# Patient Record
Sex: FEMALE | Race: WHITE | Employment: UNEMPLOYED | URBAN - METROPOLITAN AREA
[De-identification: names, ages, dates, MRNs, and addresses within clinical notes are randomized per-mention and may not be internally consistent; named-entity substitution may affect disease eponyms.]

---

## 2017-12-18 ENCOUNTER — HOSPITAL ENCOUNTER (OUTPATIENT)
Dept: LAB | Age: 27
Discharge: HOME | End: 2017-12-18

## 2018-07-03 ENCOUNTER — HOSPITAL ENCOUNTER (EMERGENCY)
Age: 28
Discharge: HOME | End: 2018-07-03
Attending: EMERGENCY MEDICINE
Payer: COMMERCIAL

## 2018-07-03 VITALS
TEMPERATURE: 97.8 F | HEART RATE: 65 BPM | HEIGHT: 69 IN | SYSTOLIC BLOOD PRESSURE: 121 MMHG | WEIGHT: 189.6 LBS | BODY MASS INDEX: 28.08 KG/M2 | RESPIRATION RATE: 16 BRPM | DIASTOLIC BLOOD PRESSURE: 71 MMHG | OXYGEN SATURATION: 100 %

## 2018-07-03 DIAGNOSIS — H66.90 ACUTE OTITIS MEDIA, UNSPECIFIED OTITIS MEDIA TYPE: ICD-10-CM

## 2018-07-03 DIAGNOSIS — H92.01 EAR PAIN, RIGHT: Primary | ICD-10-CM

## 2018-07-03 PROCEDURE — 99283 EMERGENCY DEPT VISIT LOW MDM: CPT

## 2018-07-03 PROCEDURE — 74011250637 HC RX REV CODE- 250/637: Performed by: EMERGENCY MEDICINE

## 2018-07-03 RX ORDER — AZITHROMYCIN 250 MG/1
250 TABLET, FILM COATED ORAL DAILY
Qty: 4 TAB | Refills: 0 | Status: SHIPPED | OUTPATIENT
Start: 2018-07-03 | End: 2018-07-07

## 2018-07-03 RX ORDER — AZITHROMYCIN 250 MG/1
500 TABLET, FILM COATED ORAL
Status: COMPLETED | OUTPATIENT
Start: 2018-07-03 | End: 2018-07-03

## 2018-07-03 RX ADMIN — AZITHROMYCIN 500 MG: 250 TABLET, FILM COATED ORAL at 02:39

## 2018-07-03 NOTE — DISCHARGE INSTRUCTIONS
Ear Infection (Otitis Media): Care Instructions  Your Care Instructions    An ear infection may start with a cold and affect the middle ear (otitis media). It can hurt a lot. Most ear infections clear up on their own in a couple of days. Most often you will not need antibiotics. This is because many ear infections are caused by a virus. Antibiotics don't work against a virus. Regular doses of pain medicines are the best way to reduce your fever and help you feel better. Follow-up care is a key part of your treatment and safety. Be sure to make and go to all appointments, and call your doctor if you are having problems. It's also a good idea to know your test results and keep a list of the medicines you take. How can you care for yourself at home? · Take pain medicines exactly as directed. ¨ If the doctor gave you a prescription medicine for pain, take it as prescribed. ¨ If you are not taking a prescription pain medicine, take an over-the-counter medicine, such as acetaminophen (Tylenol), ibuprofen (Advil, Motrin), or naproxen (Aleve). Read and follow all instructions on the label. ¨ Do not take two or more pain medicines at the same time unless the doctor told you to. Many pain medicines have acetaminophen, which is Tylenol. Too much acetaminophen (Tylenol) can be harmful. · Plan to take a full dose of pain reliever before bedtime. Getting enough sleep will help you get better. · Try a warm, moist washcloth on the ear. It may help relieve pain. · If your doctor prescribed antibiotics, take them as directed. Do not stop taking them just because you feel better. You need to take the full course of antibiotics. When should you call for help? Call your doctor now or seek immediate medical care if:  ? · You have new or increasing ear pain. ? · You have new or increasing pus or blood draining from your ear. ? · You have a fever with a stiff neck or a severe headache. ? Watch closely for changes in your health, and be sure to contact your doctor if:  ? · You have new or worse symptoms. ? · You are not getting better after taking an antibiotic for 2 days. Where can you learn more? Go to http://kwasi-binta.info/. Enter B263 in the search box to learn more about \"Ear Infection (Otitis Media): Care Instructions. \"  Current as of: May 12, 2017  Content Version: 11.4  © 1158-0839 [employer]. Care instructions adapted under license by [employer] (which disclaims liability or warranty for this information). If you have questions about a medical condition or this instruction, always ask your healthcare professional. [place] [address] any warranty or liability for your use of this information. Earache: Care Instructions  Your Care Instructions    Even though infection is a common cause of ear pain, not all ear pain means an infection. If you have ear pain and don't have an infection, it could be because of a jaw problem, such as temporomandibular joint (TMJ) pain. Or it could be because of a neck problem. When ear discomfort or pain is mild or comes and goes without other symptoms, home treatment may be all you need. Follow-up care is a key part of your treatment and safety. Be sure to make and go to all appointments, and call your doctor if you are having problems. It's also a good idea to know your test results and keep a list of the medicines you take. How can you care for yourself at home? · Apply heat on the ear to ease pain. To apply heat, put a warm water bottle, a heating pad set on low, or a warm cloth on your ear. Do not go to sleep with a heating pad on your skin. · Take an over-the-counter pain medicine, such as acetaminophen (Tylenol), ibuprofen (Advil, Motrin), or naproxen (Aleve). Be safe with medicines. Read and follow all instructions on the label.   · Do not take two or more pain medicines at the same time unless the doctor told you to. Many pain medicines have acetaminophen, which is Tylenol. Too much acetaminophen (Tylenol) can be harmful. · Never insert anything, such as a cotton swab or a marianela pin, into the ear. When should you call for help? Call your doctor now or seek immediate medical care if:  ? · You have new or worse symptoms of infection, such as:  ¨ Increased pain, swelling, warmth, or redness. ¨ Red streaks leading from the area. ¨ Pus draining from the area. ¨ A fever. ? Watch closely for changes in your health, and be sure to contact your doctor if:  ? · You have new or worse discharge coming from the ear. ? · You do not get better as expected. Where can you learn more? Go to http://kwasi-binta.info/. Enter U852 in the search box to learn more about \"Earache: Care Instructions. \"  Current as of: May 12, 2017  Content Version: 11.4  © 3369-5175 [employer]. Care instructions adapted under license by [employer] (which disclaims liability or warranty for this information). If you have questions about a medical condition or this instruction, always ask your healthcare professional. [address] any warranty or liability for your use of this information.
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
## 2018-07-03 NOTE — LETTER
[address] 
\[facility]\"" EMERGENCY DEPT 
[address] [id number] [phone] Work/School Note Date: 7/3/2018 To Whom It May concern: 
 
Austyn Sanders was seen and treated today in the emergency room by the following provider(s): 
Attending Provider: Osiris Yoder MD. Austyn Sanders may return to work on 7/5/18.  
 
Sincerely, 
 
 
 
 
Osiris Yoder MD

## 2018-07-07 ENCOUNTER — HOSPITAL ENCOUNTER (EMERGENCY)
Age: 28
Discharge: HOME | End: 2018-07-07
Attending: EMERGENCY MEDICINE
Payer: COMMERCIAL

## 2018-07-07 VITALS
HEIGHT: 69 IN | DIASTOLIC BLOOD PRESSURE: 76 MMHG | BODY MASS INDEX: 28.47 KG/M2 | OXYGEN SATURATION: 100 % | HEART RATE: 78 BPM | RESPIRATION RATE: 16 BRPM | SYSTOLIC BLOOD PRESSURE: 123 MMHG | TEMPERATURE: 97.9 F | WEIGHT: 192.24 LBS

## 2018-07-07 DIAGNOSIS — H93.8X1 MASS OF EAR CANAL, RIGHT: Primary | ICD-10-CM

## 2018-07-07 PROCEDURE — 74011250636 HC RX REV CODE- 250/636: Performed by: PHYSICIAN ASSISTANT

## 2018-07-07 PROCEDURE — 99283 EMERGENCY DEPT VISIT LOW MDM: CPT

## 2018-07-07 PROCEDURE — 96372 THER/PROPH/DIAG INJ SC/IM: CPT

## 2018-07-07 PROCEDURE — 74011250637 HC RX REV CODE- 250/637: Performed by: PHYSICIAN ASSISTANT

## 2018-07-07 PROCEDURE — 74011000250 HC RX REV CODE- 250: Performed by: PHYSICIAN ASSISTANT

## 2018-07-07 RX ORDER — ONDANSETRON 4 MG/1
4 TABLET, ORALLY DISINTEGRATING ORAL
Status: COMPLETED | OUTPATIENT
Start: 2018-07-07 | End: 2018-07-07

## 2018-07-07 RX ORDER — LAMOTRIGINE 100 MG/1
100 TABLET ORAL DAILY
COMMUNITY
End: 2019-01-23

## 2018-07-07 RX ORDER — IBUPROFEN 800 MG/1
800 TABLET ORAL
Qty: 20 TAB | Refills: 0 | Status: SHIPPED | OUTPATIENT
Start: 2018-07-07 | End: 2018-07-14

## 2018-07-07 RX ORDER — OFLOXACIN 3 MG/ML
5 SOLUTION AURICULAR (OTIC) 2 TIMES DAILY
COMMUNITY
End: 2018-10-19

## 2018-07-07 RX ORDER — OXYCODONE AND ACETAMINOPHEN 5; 325 MG/1; MG/1
1 TABLET ORAL
Qty: 12 TAB | Refills: 0 | Status: SHIPPED | OUTPATIENT
Start: 2018-07-07 | End: 2018-10-19

## 2018-07-07 RX ORDER — OXYCODONE AND ACETAMINOPHEN 5; 325 MG/1; MG/1
2 TABLET ORAL
Status: COMPLETED | OUTPATIENT
Start: 2018-07-07 | End: 2018-07-07

## 2018-07-07 RX ADMIN — OXYCODONE HYDROCHLORIDE AND ACETAMINOPHEN 2 TABLET: 5; 325 TABLET ORAL at 18:21

## 2018-07-07 RX ADMIN — LIDOCAINE HYDROCHLORIDE 1 G: 10 INJECTION, SOLUTION EPIDURAL; INFILTRATION; INTRACAUDAL; PERINEURAL at 18:22

## 2018-07-07 RX ADMIN — ONDANSETRON 4 MG: 4 TABLET, ORALLY DISINTEGRATING ORAL at 18:27

## 2018-07-07 NOTE — DISCHARGE INSTRUCTIONS
[facility] SYSTEMS Departments     For adult and child immunizations, family planning, TB screening, STD testing and women's health services. [facility]: [place] [phone]      [facility] 25   [address]   [address]   170 [facility] [place] [address] [facility] [address] [phone]      [address]          [place] [place] [address]     For primary care services, woman and child wellness, and some clinics providing specialty care. [facility] -- 1011 [facility]. 2525 [facility] [phone]   411 [facility] 200 [facility] 3614 [facility] [phone]   339 [facility] [address]  [phone] [address]  [employer] [address] [place] [address] 5850  Community  [phone]   [address] [place] [address] [address] [phone]   [facility] 81 [facility] [phone]   [facility] 1051 [facility] [phone]   [facility]: [facility] [address], [address] [place] [address], [address] [phone]     [address] [phone]   [facility] [address]  Community  [phone]   [employer]  [address] ([email or web address].it/about/mission. asp) [phone]         Sexual Health/Woman Wellness Clinics    For STD/HIV testing and treatment, pregnancy testing and services, men's health, birth control services, LGBT services, and hepatitis/HPV vaccine services. [employer] for [employer] 201 [address] [facility] [address] [facility] [address] [facility] 1579 600 [facility] [phone]   [facility] [address], 5th floor [phone]   Pregnancy [address] Women [address]  401 [address] [facility] [phone]         Specialty Service [address]   [phone]   [place] [employer]   [phone]   Women, Infant and Children's Services: [address] [phone]       600 [facility]   [phone]   [address]   [facility]     [phone]   [employer] [facility]   [address] [facility] [address] [phone]     Local Primary Care Physicians  [facility] [phone]  MD Jorge Powell MD Balinda Ovens, MD [facility] Doctors [phone]  Isaac Lou, [facility]  Hi Logan, MD Awais Weeks MD [address] [place] [address] [phone]  MD Radha Garrido MD [address] [facility] [phone]  MD Olya Schaefer MD Valorie Constable, MD Shawn Marie MD   [facility] [phone]  [facility], MD Connie Geiger, NP [address] [phone]  Carola Grego, MD Raj Hunt, MD Franki Bing, MD Lanney Gift, MD Letha Short, MD Michaela Saint, MD Rafael Feng MD   33 57 [facility]  Latanya Harry MD [address] [facility] [phone]  MD Lashaun Pedroza, LETITIA Juarez, MD Rolm Bonine, MD Brunilda Bloom, MD Maudie Robinsons, MD Nash Koyanagi, MD   5078 [facility] [phone]  Chino Guerra, MD Krystal Encinas, FNP  Tonio Murillo, NP  MD Lea Albarado MD Graceann General, MD Latoya Brizuela MD [facility] [phone]  MD Joseph Carlson MD Pennie Sorenson, MD Mickle Comes, MD Irving Craven, MD   [address] [phone]  Oumar MD Tye Gustafson MD [place] [phone]  MD Lupe Turcios MD Zippor[address] Lambert [place], [address] [facility] [phone]  Cameron Huerta, MD Baldemar Gonzalez, MD Awais Bhakta, MD Brooke Waller, MD Wayne Enriquez, MD Chiquita Neal, LETITIA Upton MD [address]  66   [phone]  MD Katerina Clarke, MD Daja Victoria MD   2102 [facility] [phone]  MD Joaquim Meredith, ANGELAP  Kacey Escalera, GAIL Escalera, FNP Arlan Haddock, PA-C Peri Cushing, MD Corbin Mantis, LETITIA Morel, DO Miscellaneous:  Shaggy Guevara [phone]

## 2018-07-07 NOTE — ED NOTES
Patient discharged by LUIS Chow. Patient provided with discharge instructions Rx and instructions on follow up care. Patient out of ED ambulatory accompanied by family.

## 2018-07-07 NOTE — ED PROVIDER NOTES
EMERGENCY DEPARTMENT HISTORY AND PHYSICAL EXAM 
 
 
Date: 7/7/2018 Patient Name: Chad Zhu History of Presenting Illness Chief Complaint Patient presents with  Ear Pain  
  right ear pain seen here monday thought she had bug in ear; given antibiotic; went to [facility] seen told maybe a cyst in right ear. pain is worse History Provided By: Patient HPI: Chad Zhu, 28 y.o. female presents ambulatory to the ED with cc of 5 days of 8 out of 10 constant, aching right ear pain that does not improve with oral or topical antibiotics. This is her third visit for this and was told by [facility] ED on Thursday that she has a cyst in her ear and would need to be managed by ENT. She has an ENT appointment on [id number] but feels she cannot wait that long due to the pain. She admits to a history of cystic lesions on her skin and indeed has in her medical records history of a left ear cyst that required surgical remediation. She specifically denies any fevers, chills, nausea, vomiting, chest pain, shortness of breath, headache, rash, diarrhea, sweating or weight loss. Chief Complaint: right ear pain Duration: 5 Days Timing:  Constant Location: right ear Quality: Aching Severity: 8 out of 10 Modifying Factors: no improvement with recent antibiotics Associated Symptoms: denies any other associated signs or symptoms There are no other complaints, changes, or physical findings at this time. PCP: None Current Outpatient Prescriptions Medication Sig Dispense Refill  lamoTRIgine (LAMICTAL) 100 mg tablet Take 100 mg by mouth daily.  ofloxacin (FLOXIN) 0.3 % otic solution Administer 5 Drops in right ear two (2) times a day.  ibuprofen (MOTRIN) 800 mg tablet Take 1 Tab by mouth every eight (8) hours as needed for Pain for up to 7 days. 20 Tab 0  
 oxyCODONE-acetaminophen (PERCOCET) 5-325 mg per tablet Take 1 Tab by mouth every four (4) hours as needed for Pain. Max Daily Amount: 6 Tabs. 12 Tab 0  propranolol (INDERAL) 10 mg tablet TAKE 1 TABLET BY MOUTH 30-60 MIN PRIOR TO PRESENTATION, MAY TAKE TWICE DAILY IF NEEDED 30 Tab 5  ARIPIPRAZOLE (ABILIFY PO) Take 1 Tab by mouth daily. Past History Past Medical History: 
Past Medical History:  
Diagnosis Date  Abuse  Anxiety  Depression  ETOH abuse   
 sober since 7/2008  HPV in female  Insomnia Past Surgical History: 
Past Surgical History:  
Procedure Laterality Date  HX CYST INCISION AND DRAINAGE  1/2014 Boil/cyst on right buttock  HX OTHER SURGICAL  2015  
 cyst removed by left ear Family History: 
Family History Problem Relation Age of Onset  Cancer Maternal Grandfather   
  lung  Cancer Paternal Grandmother   
  lung  No Known Problems Mother  No Known Problems Father Social History: 
Social History Substance Use Topics  Smoking status: Current Some Day Smoker Packs/day: 1.00 Types: Cigarettes  Smokeless tobacco: Never Used  Alcohol use No  
 
 
Allergies: Allergies Allergen Reactions  Amoxicillin Rash  Bactrim [Sulfamethoprim Ds] Rash Review of Systems Review of Systems Constitutional: Negative for fatigue and fever. HENT: Positive for ear pain. Negative for sore throat. Eyes: Negative for pain, redness and visual disturbance. Respiratory: Negative for cough and shortness of breath. Cardiovascular: Negative for chest pain and palpitations. Gastrointestinal: Negative for abdominal pain, nausea and vomiting. Genitourinary: Negative for dysuria, frequency and urgency. Musculoskeletal: Negative for back pain, gait problem, neck pain and neck stiffness. Skin: Negative for rash and wound. Neurological: Negative for dizziness, weakness, light-headedness, numbness and headaches. Physical Exam  
Physical Exam  
Constitutional: She is oriented to person, place, and time.  She appears well-developed and well-nourished. Non-toxic appearance. No distress. HENT:  
Head: Normocephalic and atraumatic. Right Ear: External ear normal.  
Left Ear: External ear normal.  
Nose: Nose normal.  
Mouth/Throat: Uvula is midline. No trismus in the jaw. RIGHT EAR: 
No tragal tenderness No mastoid tenderness Ear canal is without cerumen A visible ridge is evident within the canal, perhaps a cyst, that partially obstructs. Visualized portion of TM is without significant redness. There is no drainage. Eyes: Conjunctivae and EOM are normal. Pupils are equal, round, and reactive to light. No scleral icterus. Neck: Normal range of motion and full passive range of motion without pain. Cardiovascular: Normal rate and regular rhythm. Pulmonary/Chest: Effort normal. No accessory muscle usage. No tachypnea. No respiratory distress. She has no decreased breath sounds. She has no wheezes. Abdominal: Soft. There is no tenderness. Musculoskeletal: Normal range of motion. Neurological: She is alert and oriented to person, place, and time. She is not disoriented. No cranial nerve deficit. GCS eye subscore is 4. GCS verbal subscore is 5. GCS motor subscore is 6. Skin: Skin is intact. No rash noted. Psychiatric: She has a normal mood and affect. Her speech is normal.  
Nursing note and vitals reviewed. Diagnostic Study Results Labs - No results found for this or any previous visit (from the past 12 hour(s)). Radiologic Studies - No orders to display CT Results  (Last 48 hours) None CXR Results  (Last 48 hours) None Medical Decision Making I am the first provider for this patient. I reviewed the vital signs, available nursing notes, past medical history, past surgical history, family history and social history. Vital Signs-Reviewed the patient's vital signs.  
Patient Vitals for the past 12 hrs: 
 Temp Pulse Resp BP SpO2  
07/07/18 1702 97.9 °F (36.6 °C) 78 16 123/76 100 % Records Reviewed: Nursing Notes and Old Medical Records Provider Notes (Medical Decision Making): DDx: ear canal cyst; AOM; otitis externa; foreign body; TM perforation ED Course:  
Initial assessment performed. The patients presenting problems have been discussed, and they are in agreement with the care plan formulated and outlined with them. I have encouraged them to ask questions as they arise throughout their visit. Disposition: 
Discharge PLAN: 
1. Current Discharge Medication List  
  
START taking these medications Details  
ibuprofen (MOTRIN) 800 mg tablet Take 1 Tab by mouth every eight (8) hours as needed for Pain for up to 7 days. Qty: 20 Tab, Refills: 0  
  
oxyCODONE-acetaminophen (PERCOCET) 5-325 mg per tablet Take 1 Tab by mouth every four (4) hours as needed for Pain. Max Daily Amount: 6 Tabs. Qty: 12 Tab, Refills: 0 Associated Diagnoses: Mass of ear canal, right 2. Follow-up Information Follow up With Details Comments Contact Info Natty Reynoso MD Schedule an appointment as soon as possible for a visit ENT: call to schedule follow up [address] [facility] 
[phone] [facility] Schedule an appointment as soon as possible for a visit ENT: or call to schedule follow up [address] [facility] [address] [facility] 
[phone] Return to ED if worse Diagnosis Clinical Impression: 1. Mass of ear canal, right

## 2018-07-07 NOTE — LETTER
[address] 
[facility] EMERGENCY DEPT 
[address] [place] [address] [address] [id number] [phone] Work/School Note Date: 7/7/2018 To Whom It May concern: 
 
Portia Santillan was seen and treated today in the emergency room by the following provider(s): 
Attending Provider: Rolf Orozco. Keyur Campo MD 
Physician Assistant: LUIS Guerrero. Portia Santillan may return to work on [id number]. Sincerely, LUIS Guerrero

## 2018-07-07 NOTE — ED TRIAGE NOTES
Assumed care of this patient. She is alert and oriented x4. Patient ambulatory to ED with c/o right ear pain. Patient seen in this ED 3 Jul for ear pain and was told that she had abrasion to the ear drum. Patient continued to have pain and was seen by [facility] and was told that she may potentially have a cyst and was referred to ENT. Patient states that she has appt on 23 July, but is unable to wait because of the pain.

## 2018-09-22 ENCOUNTER — HOSPITAL ENCOUNTER (EMERGENCY)
Age: 28
Discharge: HOME | End: 2018-09-22
Attending: EMERGENCY MEDICINE
Payer: SELF-PAY

## 2018-09-22 VITALS
TEMPERATURE: 98.6 F | RESPIRATION RATE: 14 BRPM | SYSTOLIC BLOOD PRESSURE: 136 MMHG | BODY MASS INDEX: 27.76 KG/M2 | OXYGEN SATURATION: 100 % | HEIGHT: 69 IN | WEIGHT: 187.39 LBS | DIASTOLIC BLOOD PRESSURE: 89 MMHG | HEART RATE: 104 BPM

## 2018-09-22 DIAGNOSIS — L02.91 ABSCESS: Primary | ICD-10-CM

## 2018-09-22 PROCEDURE — 75810000289 HC I&D ABSCESS SIMP/COMP/MULT

## 2018-09-22 PROCEDURE — 74011250637 HC RX REV CODE- 250/637: Performed by: PHYSICIAN ASSISTANT

## 2018-09-22 PROCEDURE — 99283 EMERGENCY DEPT VISIT LOW MDM: CPT

## 2018-09-22 RX ORDER — DOXYCYCLINE HYCLATE 100 MG
100 TABLET ORAL 2 TIMES DAILY
Qty: 14 TAB | Refills: 0 | Status: SHIPPED | OUTPATIENT
Start: 2018-09-22 | End: 2018-09-29

## 2018-09-22 RX ORDER — LIDOCAINE HYDROCHLORIDE AND EPINEPHRINE 20; 10 MG/ML; UG/ML
5 INJECTION, SOLUTION INFILTRATION; PERINEURAL ONCE
Status: DISCONTINUED | OUTPATIENT
Start: 2018-09-22 | End: 2018-09-22 | Stop reason: HOSPADM

## 2018-09-22 RX ORDER — HYDROCODONE BITARTRATE AND ACETAMINOPHEN 5; 325 MG/1; MG/1
1 TABLET ORAL
Qty: 10 TAB | Refills: 0 | Status: SHIPPED | OUTPATIENT
Start: 2018-09-22 | End: 2018-10-19

## 2018-09-22 RX ORDER — OXYCODONE AND ACETAMINOPHEN 5; 325 MG/1; MG/1
1 TABLET ORAL
Status: COMPLETED | OUTPATIENT
Start: 2018-09-22 | End: 2018-09-22

## 2018-09-22 RX ADMIN — OXYCODONE HYDROCHLORIDE AND ACETAMINOPHEN 1 TABLET: 5; 325 TABLET ORAL at 19:06

## 2018-09-22 NOTE — DISCHARGE INSTRUCTIONS
Thank you!     Thank you for allowing us to provide you with excellent care today. We hope we addressed all of your concerns and needs. We strive to provide excellent quality care in the Emergency Department. You will receive a survey after your visit to evaluate the care you were provided.      Please rate us a level 5 (excellent), as anything less than excellent does not meet our goals.      If you feel that you have not received excellent quality care or timely care, please ask to speak to the nurse manager. Please choose us in the future for your continued health care needs. ______________________________________________________________________    The exam and treatment you received in the Emergency Department were for an urgent problem and are not intended as complete care. It is important that you follow-up with a doctor, nurse practitioner, or physician assistant to:  (1) confirm your diagnosis,  (2) re-evaluation of changes in your illness and treatment, and  (3) for ongoing care. If your symptoms become worse or you do not improve as expected and you are unable to reach your usual health care provider, you should return to the Emergency Department. We are available 24 hours a day. Take this sheet with you when you go to your follow-up visit. If you have any problem arranging the follow-up visit, contact [address] [place] [address] 21 [phone])    Make an appointment with your Primary Care doctor for follow up of this visit. Return to the ER if you are unable to be seen in the time recommended on your discharge instructions. Skin Abscess: Care Instructions  Your Care Instructions    A skin abscess is a bacterial infection that forms a pocket of pus. A boil is a kind of skin abscess. The doctor may have cut an opening in the abscess so that the pus can drain out. You may have gauze in the cut so that the abscess will stay open and keep draining. You may need antibiotics.  You will need to follow up with your doctor to make sure the infection has gone away. The doctor has checked you carefully, but problems can develop later. If you notice any problems or new symptoms, get medical treatment right away. Follow-up care is a key part of your treatment and safety. Be sure to make and go to all appointments, and call your doctor if you are having problems. It's also a good idea to know your test results and keep a list of the medicines you take. How can you care for yourself at home? · Apply warm and dry compresses, a heating pad set on low, or a hot water bottle 3 or 4 times a day for pain. Keep a cloth between the heat source and your skin. · If your doctor prescribed antibiotics, take them as directed. Do not stop taking them just because you feel better. You need to take the full course of antibiotics. · Take pain medicines exactly as directed. ¨ If the doctor gave you a prescription medicine for pain, take it as prescribed. ¨ If you are not taking a prescription pain medicine, ask your doctor if you can take an over-the-counter medicine. · Keep your bandage clean and dry. Change the bandage whenever it gets wet or dirty, or at least one time a day. · If the abscess was packed with gauze:  ¨ Keep follow-up appointments to have the gauze changed or removed. If the doctor instructed you to remove the gauze, gently pull out all of the gauze when your doctor tells you to. ¨ After the gauze is removed, soak the area in warm water for 15 to 20 minutes 2 times a day, until the wound closes. When should you call for help? Call your doctor now or seek immediate medical care if:    · You have signs of worsening infection, such as:  ¨ Increased pain, swelling, warmth, or redness. ¨ Red streaks leading from the infected skin. ¨ Pus draining from the wound. ¨ A fever.    Watch closely for changes in your health, and be sure to contact your doctor if:    · You do not get better as expected. Where can you learn more?   Go to http://kwasi-binta.info/. Enter C824 in the search box to learn more about \"Skin Abscess: Care Instructions. \"  Current as of: May 10, 2017  Content Version: 11.7  © 2575-1674 [employer]. Care instructions adapted under license by [employer] (which disclaims liability or warranty for this information). If you have questions about a medical condition or this instruction, always ask your healthcare professional. [place] [address] any warranty or liability for your use of this information.

## 2018-09-22 NOTE — ED PROVIDER NOTES
EMERGENCY DEPARTMENT HISTORY AND PHYSICAL EXAM 
 
 
Date: 9/22/2018 Patient Name: Sudha Amaya History of Presenting Illness Chief Complaint Patient presents with  Abscess Ambulatory into the ED with c/o abscess to Lt groin x 2 days; history of the same. History Provided By: Patient HPI: Sudha Amaya, 28 y.o. female with PMHx significant for anxiety, EtOH abuse, and depression, presents ambulatory to the ED with cc of abscess. Patient states that for the past 2 days she had had an abscess in her left groin. She has tried warm compresses with no relief of symptoms. She reports a history of frequent abscesses and diagnosis of hidradenitis. She denies any drainage from the site, but notes that it is very painful. She denies fevers, chills, NVD, abdominal pain or recent antibiotic use. Denies any other complaints at this time. Chief Complaint: abscess Duration: 2 Days Timing:  Acute Location: left groin Quality: Aching Severity: 8 out of 10 Modifying Factors: none Associated Symptoms: denies any other associated signs or symptoms There are no other complaints, changes, or physical findings at this time. PCP: None Current Facility-Administered Medications Medication Dose Route Frequency Provider Last Rate Last Dose  lidocaine-EPINEPHrine (XYLOCAINE) 2 %-1:100,000 injection 100 mg  5 mL SubCUTAneous ONCE Alessia Nolasco PA-C Current Outpatient Prescriptions Medication Sig Dispense Refill  doxycycline (VIBRA-TABS) 100 mg tablet Take 1 Tab by mouth two (2) times a day for 7 days. 14 Tab 0  
 HYDROcodone-acetaminophen (NORCO) 5-325 mg per tablet Take 1 Tab by mouth every six (6) hours as needed for Pain. Max Daily Amount: 4 Tabs. 10 Tab 0  
 lamoTRIgine (LAMICTAL) 100 mg tablet Take 100 mg by mouth daily.  ofloxacin (FLOXIN) 0.3 % otic solution Administer 5 Drops in right ear two (2) times a day.  oxyCODONE-acetaminophen (PERCOCET) 5-325 mg per tablet Take 1 Tab by mouth every four (4) hours as needed for Pain. Max Daily Amount: 6 Tabs. 12 Tab 0  propranolol (INDERAL) 10 mg tablet TAKE 1 TABLET BY MOUTH 30-60 MIN PRIOR TO PRESENTATION, MAY TAKE TWICE DAILY IF NEEDED 30 Tab 5  ARIPIPRAZOLE (ABILIFY PO) Take 1 Tab by mouth daily. Past History Past Medical History: 
Past Medical History:  
Diagnosis Date  Abuse  Anxiety  Depression  ETOH abuse   
 sober since 7/2008  HPV in female  Insomnia Past Surgical History: 
Past Surgical History:  
Procedure Laterality Date  HX CYST INCISION AND DRAINAGE  1/2014 Boil/cyst on right buttock  HX OTHER SURGICAL  2015  
 cyst removed by left ear Family History: 
Family History Problem Relation Age of Onset  Cancer Maternal Grandfather   
  lung  Cancer Paternal Grandmother   
  lung  No Known Problems Mother  No Known Problems Father Social History: 
Social History Substance Use Topics  Smoking status: Current Some Day Smoker Packs/day: 1.00 Types: Cigarettes  Smokeless tobacco: Never Used  Alcohol use No  
 
 
Allergies: Allergies Allergen Reactions  Amoxicillin Rash  Bactrim [Sulfamethoprim Ds] Rash Review of Systems Review of Systems Constitutional: Negative for chills and fever. HENT: Negative for sore throat. Eyes: Negative for pain. Respiratory: Negative for cough and shortness of breath. Cardiovascular: Negative for chest pain. Gastrointestinal: Negative for abdominal pain, diarrhea, nausea and vomiting. Genitourinary: Negative for dysuria and hematuria. Musculoskeletal: Negative for arthralgias and myalgias. Skin: Abscess to left groin Neurological: Negative for dizziness, light-headedness, numbness and headaches. Psychiatric/Behavioral: Negative for behavioral problems and confusion.   
 
 
Physical Exam  
 Physical Exam  
Constitutional: She is oriented to person, place, and time. She appears well-developed and well-nourished. No distress. HENT:  
Head: Normocephalic and atraumatic. Right Ear: External ear normal.  
Left Ear: External ear normal.  
Nose: Nose normal.  
Eyes: Conjunctivae and EOM are normal.  
Neck: Normal range of motion. Neck supple. Cardiovascular: Normal rate, regular rhythm and normal heart sounds. Pulmonary/Chest: Effort normal and breath sounds normal. She has no decreased breath sounds. She has no wheezes. Abdominal: Soft. Bowel sounds are normal. She exhibits no distension. There is no tenderness. There is no guarding. Musculoskeletal: Normal range of motion. She exhibits no edema or tenderness. Neurological: She is alert and oriented to person, place, and time. Skin: Skin is warm and dry. She is not diaphoretic. Psychiatric: She has a normal mood and affect. Her behavior is normal. Judgment normal.  
Nursing note and vitals reviewed. Diagnostic Study Results Labs - No results found for this or any previous visit (from the past 12 hour(s)). Radiologic Studies - Medical Decision Making I am the first provider for this patient. I reviewed the vital signs, available nursing notes, past medical history, past surgical history, family history and social history. Vital Signs-Reviewed the patient's vital signs. Patient Vitals for the past 12 hrs: 
 Temp Pulse Resp BP SpO2  
09/22/18 1845 98.6 °F (37 °C) (!) 104 14 136/89 100 % Records Reviewed: Nursing Notes and Old Medical Records Provider Notes (Medical Decision Making): DDx: abscess, folliculitis, cellulitis, poor hygiene. Patient presents with abscess formation to left groin. Will perform I&D and d/c with antibiotics. Recommend f/u with PCP in 2-3 days for wound recheck. ED Course:  
Initial assessment performed.  The patients presenting problems have been discussed, and they are in agreement with the care plan formulated and outlined with them. I have encouraged them to ask questions as they arise throughout their visit. Procedure Note - Incision and Drainage:  
7:40 PM 
Performed by: Sedrick Pradhan PA-C Complexity: Simple Skin prepped with Hibiclens. Sterile field established. Anesthesia achieved with 1.5 mLs of Lidocaine 2% with epinephrine using a local infiltration. Abscess to inguinal region: left was incised with # 11 blade, and 3 mLs of purulent drainage was expressed. Wound probed and irrigated. Sterile dressing applied. Estimated blood loss: < 5 mL The procedure took 1-15 minutes, and pt tolerated well. Disposition: 
DISCHARGE NOTE: 
7:51 PM 
The care plan has been outline with the patient and/or family, who verbally conveyed understanding and agreement. Available results have been reviewed. Patient and/or family understand the follow up plan as outlined and discharge instructions. Should their condition deterioration at any time after discharge the patient agrees to return, follow up sooner than outlined or seek medical assistance at the closest Emergency Room as soon as possible. Questions have been answered. Discharge instructions and educational information regarding the patient's diagnosis as well a list of reasons why the patient would want to seek immediate medical attention, should their condition change, were reviewed directly with the patient/family PLAN: 
1. Discharge home 2. Medications as directed 3. Schedule f/u with PCP 4. Return precautions reviewed Discharge Medication List as of 9/22/2018  7:49 PM  
  
START taking these medications Details  
doxycycline (VIBRA-TABS) 100 mg tablet Take 1 Tab by mouth two (2) times a day for 7 days. , Normal, Disp-14 Tab, R-0  
  
HYDROcodone-acetaminophen (NORCO) 5-325 mg per tablet Take 1 Tab by mouth every six (6) hours as needed for Pain. Max Daily Amount: 4 Tabs., Print, Disp-10 Tab, R-0  
  
  
CONTINUE these medications which have NOT CHANGED Details  
lamoTRIgine (LAMICTAL) 100 mg tablet Take 100 mg by mouth daily. , Historical Med  
  
ofloxacin (FLOXIN) 0.3 % otic solution Administer 5 Drops in right ear two (2) times a day., Historical Med  
  
oxyCODONE-acetaminophen (PERCOCET) 5-325 mg per tablet Take 1 Tab by mouth every four (4) hours as needed for Pain. Max Daily Amount: 6 Tabs., Print, Disp-12 Tab, R-0  
  
propranolol (INDERAL) 10 mg tablet TAKE 1 TABLET BY MOUTH 30-60 MIN PRIOR TO PRESENTATION, MAY TAKE TWICE DAILY IF NEEDED, Normal, Disp-30 Tab, R-5  
  
ARIPIPRAZOLE (ABILIFY PO) Take 1 Tab by mouth daily. , Historical Med  
  
  
 
 
5.  
Follow-up Information Follow up With Details Comments Contact Info Establish PCP     
 [facility] EMERGENCY DEPT  As needed, If symptoms worsen, For wound re-check [address] [place] [address] [address] [facility] 
[phone] Return to ED if worse Diagnosis Clinical Impression: 1. Abscess This note will not be viewable in [address]

## 2018-09-22 NOTE — LETTER
[address] 
[facility] EMERGENCY DEPT 
[address] [facility] [address] [id number] [phone] Work/School Note Date: 9/22/2018 To Whom It May concern: 
 
Mickie Simeon was seen and treated today in the emergency room by the following provider(s): 
Attending Provider: Juan Carlos Ford DO Physician Assistant: Ellen Johnson PA-C. Mickie Simeon may return to work on 25 September 2018. Sincerely, Ellen Johnson PA-C

## 2018-09-23 NOTE — ED NOTES
Pt received discharge instructions from PA and verbalized understanding. Pt ambulatory to exit with a steady gait.

## 2018-10-19 ENCOUNTER — HOSPITAL ENCOUNTER (EMERGENCY)
Age: 28
Discharge: HOME | End: 2018-10-19
Attending: EMERGENCY MEDICINE
Payer: SELF-PAY

## 2018-10-19 VITALS
BODY MASS INDEX: 27.59 KG/M2 | DIASTOLIC BLOOD PRESSURE: 78 MMHG | OXYGEN SATURATION: 100 % | HEIGHT: 69 IN | HEART RATE: 75 BPM | WEIGHT: 186.29 LBS | RESPIRATION RATE: 16 BRPM | TEMPERATURE: 97.5 F | SYSTOLIC BLOOD PRESSURE: 123 MMHG

## 2018-10-19 DIAGNOSIS — L02.224 BOIL, GROIN: ICD-10-CM

## 2018-10-19 DIAGNOSIS — L73.2 SUPPURATIVE HIDRADENITIS: Primary | ICD-10-CM

## 2018-10-19 PROCEDURE — 99282 EMERGENCY DEPT VISIT SF MDM: CPT

## 2018-10-19 RX ORDER — DOXYCYCLINE 150 MG/1
150 TABLET ORAL 2 TIMES DAILY
COMMUNITY
Start: 2018-10-18 | End: 2018-10-24

## 2018-10-19 NOTE — ED PROVIDER NOTES
EMERGENCY DEPARTMENT HISTORY AND PHYSICAL EXAM 
 
 
Date: 10/19/2018 Patient Name: Calista Camacho History of Presenting Illness Chief Complaint Patient presents with  Abscess Patient complain of an \"open wound\" to left groin History Provided By: Patient HPI: Calista Camacho, 28 y.o. female with PMHx significant for hidradenitis, presents to the ED with cc of a boil to the right groin x several days that is itchy and bleeding. Pt notes numerous boils around her \"underwear line\" in the past that have needed to get drained. Pt notes this one continues to bleed. There has been no purulent drainage. There are no other complaints, changes, or physical findings at this time. Social Hx: Tobacco (uses a vape pen with nicotine), EtOH (social), Illicit drug use (denies) PCP: None Current Outpatient Medications Medication Sig Dispense Refill  doxycycline monohydrate (VIBRAMYCIN) 150 mg tab tablet Take 150 mg by mouth two (2) times a day.  propranolol (INDERAL) 10 mg tablet TAKE 1 TABLET BY MOUTH 30-60 MIN PRIOR TO PRESENTATION, MAY TAKE TWICE DAILY IF NEEDED 30 Tab 5  lamoTRIgine (LAMICTAL) 100 mg tablet Take 100 mg by mouth daily.  ARIPIPRAZOLE (ABILIFY PO) Take 1 Tab by mouth daily. Past History Past Medical History: 
Past Medical History:  
Diagnosis Date  Abuse  Anxiety  Depression  ETOH abuse   
 sober since 7/2008  HPV in female  Insomnia Past Surgical History: 
Past Surgical History:  
Procedure Laterality Date  HX CYST INCISION AND DRAINAGE  1/2014 Boil/cyst on right buttock  HX OTHER SURGICAL  2015  
 cyst removed by left ear Family History: 
Family History Problem Relation Age of Onset  Cancer Maternal Grandfather   
     lung  Cancer Paternal Grandmother   
     lung  No Known Problems Mother  No Known Problems Father Social History: 
Social History Tobacco Use  
  Smoking status: Current Some Day Smoker Packs/day: 1.00 Types: Cigarettes  Smokeless tobacco: Never Used Substance Use Topics  Alcohol use: No  
 Drug use: No  
 
 
Allergies: Allergies Allergen Reactions  Amoxicillin Rash  Bactrim [Sulfamethoprim Ds] Rash Review of Systems Review of Systems Constitutional: Negative for chills, diaphoresis and fever. HENT: Negative for congestion, ear pain, rhinorrhea and sore throat. Respiratory: Negative for cough and shortness of breath. Cardiovascular: Negative for chest pain. Gastrointestinal: Negative for abdominal pain, constipation, diarrhea, nausea and vomiting. Genitourinary: Negative for difficulty urinating, dysuria, frequency and hematuria. Musculoskeletal: Negative for arthralgias and myalgias. Skin: Positive for rash and wound. Neurological: Negative for headaches. All other systems reviewed and are negative. Physical Exam  
Physical Exam  
Constitutional: She is oriented to person, place, and time. She appears well-developed and well-nourished. No distress. 28 y.o.  female HENT:  
Head: Normocephalic and atraumatic. Eyes: Conjunctivae are normal. Right eye exhibits no discharge. Left eye exhibits no discharge. Neck: Normal range of motion. Neck supple. Cardiovascular: Normal rate, regular rhythm and normal heart sounds. No murmur heard. Pulmonary/Chest: Effort normal and breath sounds normal. No respiratory distress. Neurological: She is alert and oriented to person, place, and time. Skin: Skin is warm and dry. She is not diaphoretic. Small abscess to the R groin that is scabbed over. No bleeding, drainage or surrounding erythema. Non-tender palpation. Psychiatric: She has a normal mood and affect. Her behavior is normal.  
Nursing note and vitals reviewed. Diagnostic Study Results Labs - None Radiologic Studies - None Medical Decision Making I am the first provider for this patient. I reviewed the vital signs, available nursing notes, past medical history, past surgical history, family history and social history. Vital Signs-Reviewed the patient's vital signs. Patient Vitals for the past 12 hrs: 
 Temp Pulse Resp BP SpO2  
10/19/18 0754 97.5 °F (36.4 °C) 75 16 123/78 100 % Records Reviewed: Nursing Notes and Old Medical Records Provider Notes (Medical Decision Making): Abscess, boil, folliculitis, dermatitis,  
 
ED Course:  
Initial assessment performed. The patients presenting problems have been discussed, and they are in agreement with the care plan formulated and outlined with them. I have encouraged them to ask questions as they arise throughout their visit. Critical Care Time:  
None Disposition: 
DISCHARGE NOTE: 
8:22 AM 
The pt is ready for discharge. The pt's signs, symptoms, diagnosis, and discharge instructions have been discussed and pt has conveyed their understanding. The pt is to follow up as recommended or return to ER should their symptoms worsen. Plan has been discussed and pt is in agreement. PLAN: 
1. Current Discharge Medication List  
  
CONTINUE these medications which have NOT CHANGED Details  
doxycycline monohydrate (VIBRAMYCIN) 150 mg tab tablet Take 150 mg by mouth two (2) times a day. propranolol (INDERAL) 10 mg tablet TAKE 1 TABLET BY MOUTH 30-60 MIN PRIOR TO PRESENTATION, MAY TAKE TWICE DAILY IF NEEDED Qty: 30 Tab, Refills: 5  
  
lamoTRIgine (LAMICTAL) 100 mg tablet Take 100 mg by mouth daily. ARIPIPRAZOLE (ABILIFY PO) Take 1 Tab by mouth daily. 2.  
Follow-up Information Follow up With Specialties Details Why Contact Ernesto Gimenez, DO Dermatology In 1 week As needed for wound check [address] [facility] [address] [phone] 
  
  
3. Wound care as discussed Return to ED if worse Diagnosis Clinical Impression: 1. Suppurative hidradenitis 2. Boil, groin This note will not be viewable in [address]

## 2018-10-19 NOTE — LETTER
[address] 
[facility] EMERGENCY DEPT 
[address] [address] 
[phone] Work/School Note Date: 10/19/2018 To Whom It May concern: 
 
Asa Soto was seen and treated today in the emergency room by the following provider(s): 
Attending Provider: Raven Jean DO Physician Assistant: Librado Colon, [address]. Please excuse Asa Soto from work today. Sincerely, Coby Kebede, [address]

## 2018-10-19 NOTE — DISCHARGE INSTRUCTIONS
Hidradenitis Suppurativa: Care Instructions  Your Care Instructions    Hidradenitis suppurativa (say \"ger-avow-wi-NY-tus sup-sulma-uh-TY-vuh\") is a skin condition that causes lumps on the skin that look like pimples or boils. The lumps are usually painful and can break open and drain blood and bad-smelling pus. The condition can come and go for many years. Treatment for this condition may include antibiotics and other medicines. You may need surgery to remove the lumps. Home care includes wearing loose-fitting clothes and washing the area gently. You can help prevent lumps from coming back by staying at a healthy weight and not smoking. Doctors don't know exactly how this condition starts. But they do know that something irritates and inflames the hair follicles, causing them to swell and form lumps. This skin condition can't be spread from person to person (isn't contagious). Follow-up care is a key part of your treatment and safety. Be sure to make and go to all appointments, and call your doctor if you are having problems. It's also a good idea to know your test results and keep a list of the medicines you take. How can you care for yourself at home?  [facility] care    · Wash the area every day with mild soap. Use your hands rather than a washcloth or sponge when you wash that part of your body.     · Leave the affected areas uncovered when you can. If you have lumps that are draining, you can cover them with a bandage or other dressing. Put petroleum jelly (such as Vaseline) on the dressing to help keep it from sticking.     · Wear-loose fitting clothes that don't rub against the area. Avoid activities that cause skin to rub together.     · If you have pain, try a warm compress. Soak a towel or washcloth in warm water, wring it out, and place it on the affected skin for about 10 minutes. Medicines    · Be safe with medicines. Take your medicines exactly as prescribed.  Call your doctor if you think you are having a problem with your medicine. You will get more details on the specific medicines your doctor prescribes.     · If your doctor prescribed antibiotics, take them as directed. Do not stop taking them just because you feel better. You need to take the full course of antibiotics.    Lifestyle choices    · If you smoke, think about quitting. Smoking can make the condition worse. If you need help quitting, talk to your doctor about stop-smoking programs and medicines. These can increase your chances of quitting for good.     · Stay at a healthy weight, or lose weight, by eating healthy foods and being physically active. Being overweight could make this condition worse. When should you call for help? Call your doctor now or seek immediate medical care if:    · You have symptoms of infection, such as:  ? Increased pain, swelling, warmth, or redness. ? Red streaks leading from the area. ? Pus draining from the area. ? A fever.    Watch closely for changes in your health, and be sure to contact your doctor if:    · You do not get better as expected. Where can you learn more? Go to http://kwasi-binta.info/. Enter H741 in the search box to learn more about \"Hidradenitis Suppurativa: Care Instructions. \"  Current as of: April 18, 2018  Content Version: 11.8  © 3690-4640 [employer]. Care instructions adapted under license by [employer] (which disclaims liability or warranty for this information). If you have questions about a medical condition or this instruction, always ask your healthcare professional. [place] [address] any warranty or liability for your use of this information.

## 2019-01-23 ENCOUNTER — APPOINTMENT (OUTPATIENT)
Dept: GENERAL RADIOLOGY | Age: 29
End: 2019-01-23
Attending: EMERGENCY MEDICINE
Payer: MEDICAID

## 2019-01-23 ENCOUNTER — HOSPITAL ENCOUNTER (EMERGENCY)
Age: 29
Discharge: HOME | End: 2019-01-24
Attending: EMERGENCY MEDICINE
Payer: MEDICAID

## 2019-01-23 ENCOUNTER — APPOINTMENT (OUTPATIENT)
Dept: CT IMAGING | Age: 29
End: 2019-01-23
Attending: EMERGENCY MEDICINE
Payer: MEDICAID

## 2019-01-23 DIAGNOSIS — F41.9 ANXIETY: ICD-10-CM

## 2019-01-23 DIAGNOSIS — M79.609 PARESTHESIA AND PAIN OF EXTREMITY: Primary | ICD-10-CM

## 2019-01-23 DIAGNOSIS — R20.2 PARESTHESIA AND PAIN OF EXTREMITY: Primary | ICD-10-CM

## 2019-01-23 LAB
ALBUMIN SERPL-MCNC: 3.7 G/DL (ref 3.5–5)
ALBUMIN/GLOB SERPL: 1.1 {RATIO} (ref 1.1–2.2)
ALP SERPL-CCNC: 56 U/L (ref 45–117)
ALT SERPL-CCNC: 29 U/L (ref 12–78)
ANION GAP BLD CALC-SCNC: 21 MMOL/L (ref 10–20)
ANION GAP SERPL CALC-SCNC: 9 MMOL/L (ref 5–15)
APAP SERPL-MCNC: <2 UG/ML (ref 10–30)
AST SERPL-CCNC: 15 U/L (ref 15–37)
BASOPHILS # BLD: 0 K/UL (ref 0–0.1)
BASOPHILS NFR BLD: 0 % (ref 0–1)
BILIRUB SERPL-MCNC: 0.3 MG/DL (ref 0.2–1)
BUN BLD-MCNC: 17 MG/DL (ref 9–20)
BUN SERPL-MCNC: 17 MG/DL (ref 6–20)
BUN/CREAT SERPL: 19 (ref 12–20)
CA-I BLD-MCNC: 1.13 MMOL/L (ref 1.12–1.32)
CALCIUM SERPL-MCNC: 9.3 MG/DL (ref 8.5–10.1)
CHLORIDE BLD-SCNC: 101 MMOL/L (ref 98–107)
CHLORIDE SERPL-SCNC: 101 MMOL/L (ref 97–108)
CK SERPL-CCNC: 64 U/L (ref 26–192)
CO2 BLD-SCNC: 23 MMOL/L (ref 21–32)
CO2 SERPL-SCNC: 26 MMOL/L (ref 21–32)
CREAT BLD-MCNC: 0.8 MG/DL (ref 0.6–1.3)
CREAT SERPL-MCNC: 0.88 MG/DL (ref 0.55–1.02)
DIFFERENTIAL METHOD BLD: ABNORMAL
EOSINOPHIL # BLD: 0.1 K/UL (ref 0–0.4)
EOSINOPHIL NFR BLD: 0 % (ref 0–7)
ERYTHROCYTE [DISTWIDTH] IN BLOOD BY AUTOMATED COUNT: 12.8 % (ref 11.5–14.5)
ETHANOL SERPL-MCNC: <10 MG/DL
GLOBULIN SER CALC-MCNC: 3.4 G/DL (ref 2–4)
GLUCOSE BLD STRIP.AUTO-MCNC: 94 MG/DL (ref 65–100)
GLUCOSE BLD-MCNC: 112 MG/DL (ref 65–100)
GLUCOSE SERPL-MCNC: 92 MG/DL (ref 65–100)
HCG UR QL: NEGATIVE
HCT VFR BLD AUTO: 40.3 % (ref 35–47)
HCT VFR BLD CALC: 54 % (ref 35–47)
HGB BLD-MCNC: 13.3 G/DL (ref 11.5–16)
IMM GRANULOCYTES # BLD AUTO: 0.1 K/UL (ref 0–0.04)
IMM GRANULOCYTES NFR BLD AUTO: 1 % (ref 0–0.5)
INR BLD: 1.2 (ref 0.9–1.2)
LYMPHOCYTES # BLD: 1.7 K/UL (ref 0.8–3.5)
LYMPHOCYTES NFR BLD: 14 % (ref 12–49)
MCH RBC QN AUTO: 31.7 PG (ref 26–34)
MCHC RBC AUTO-ENTMCNC: 33 G/DL (ref 30–36.5)
MCV RBC AUTO: 96 FL (ref 80–99)
MONOCYTES # BLD: 0.1 K/UL (ref 0–1)
MONOCYTES NFR BLD: 1 % (ref 5–13)
NEUTS SEG # BLD: 10.5 K/UL (ref 1.8–8)
NEUTS SEG NFR BLD: 85 % (ref 32–75)
NRBC # BLD: 0 K/UL (ref 0–0.01)
NRBC BLD-RTO: 0 PER 100 WBC
PLATELET # BLD AUTO: 312 K/UL (ref 150–400)
PMV BLD AUTO: 9.9 FL (ref 8.9–12.9)
POTASSIUM BLD-SCNC: 3.6 MMOL/L (ref 3.5–5.1)
POTASSIUM SERPL-SCNC: 3.6 MMOL/L (ref 3.5–5.1)
PROT SERPL-MCNC: 7.1 G/DL (ref 6.4–8.2)
RBC # BLD AUTO: 4.2 M/UL (ref 3.8–5.2)
SALICYLATES SERPL-MCNC: <1.7 MG/DL (ref 2.8–20)
SERVICE CMNT-IMP: ABNORMAL
SERVICE CMNT-IMP: NORMAL
SODIUM BLD-SCNC: 140 MMOL/L (ref 136–145)
SODIUM SERPL-SCNC: 136 MMOL/L (ref 136–145)
UR CULT HOLD, URHOLD: NORMAL
WBC # BLD AUTO: 12.5 K/UL (ref 3.6–11)

## 2019-01-23 PROCEDURE — 74011000258 HC RX REV CODE- 258: Performed by: RADIOLOGY

## 2019-01-23 PROCEDURE — 96375 TX/PRO/DX INJ NEW DRUG ADDON: CPT

## 2019-01-23 PROCEDURE — 70496 CT ANGIOGRAPHY HEAD: CPT

## 2019-01-23 PROCEDURE — 70450 CT HEAD/BRAIN W/O DYE: CPT

## 2019-01-23 PROCEDURE — 74011636320 HC RX REV CODE- 636/320: Performed by: RADIOLOGY

## 2019-01-23 PROCEDURE — 82550 ASSAY OF CK (CPK): CPT

## 2019-01-23 PROCEDURE — 93005 ELECTROCARDIOGRAM TRACING: CPT

## 2019-01-23 PROCEDURE — 81025 URINE PREGNANCY TEST: CPT

## 2019-01-23 PROCEDURE — 80047 BASIC METABLC PNL IONIZED CA: CPT

## 2019-01-23 PROCEDURE — 80307 DRUG TEST PRSMV CHEM ANLYZR: CPT

## 2019-01-23 PROCEDURE — 96374 THER/PROPH/DIAG INJ IV PUSH: CPT

## 2019-01-23 PROCEDURE — 85610 PROTHROMBIN TIME: CPT

## 2019-01-23 PROCEDURE — 81001 URINALYSIS AUTO W/SCOPE: CPT

## 2019-01-23 PROCEDURE — 82962 GLUCOSE BLOOD TEST: CPT

## 2019-01-23 PROCEDURE — 71045 X-RAY EXAM CHEST 1 VIEW: CPT

## 2019-01-23 PROCEDURE — 36415 COLL VENOUS BLD VENIPUNCTURE: CPT

## 2019-01-23 PROCEDURE — 0042T CT CODE NEURO PERF W CBF: CPT

## 2019-01-23 PROCEDURE — 99285 EMERGENCY DEPT VISIT HI MDM: CPT

## 2019-01-23 PROCEDURE — 74011250636 HC RX REV CODE- 250/636: Performed by: EMERGENCY MEDICINE

## 2019-01-23 PROCEDURE — 80053 COMPREHEN METABOLIC PANEL: CPT

## 2019-01-23 PROCEDURE — 85025 COMPLETE CBC W/AUTO DIFF WBC: CPT

## 2019-01-23 PROCEDURE — 74011250637 HC RX REV CODE- 250/637: Performed by: EMERGENCY MEDICINE

## 2019-01-23 RX ORDER — IBUPROFEN 400 MG/1
TABLET ORAL
COMMUNITY

## 2019-01-23 RX ORDER — ACETAMINOPHEN 500 MG
1000 TABLET ORAL ONCE
Status: COMPLETED | OUTPATIENT
Start: 2019-01-23 | End: 2019-01-23

## 2019-01-23 RX ORDER — SODIUM CHLORIDE 0.9 % (FLUSH) 0.9 %
10 SYRINGE (ML) INJECTION
Status: COMPLETED | OUTPATIENT
Start: 2019-01-23 | End: 2019-01-23

## 2019-01-23 RX ORDER — SODIUM CHLORIDE 0.9 % (FLUSH) 0.9 %
5-40 SYRINGE (ML) INJECTION EVERY 8 HOURS
Status: DISCONTINUED | OUTPATIENT
Start: 2019-01-23 | End: 2019-01-24 | Stop reason: HOSPADM

## 2019-01-23 RX ORDER — ARIPIPRAZOLE 15 MG/1
15 TABLET ORAL DAILY
COMMUNITY

## 2019-01-23 RX ORDER — SODIUM CHLORIDE 0.9 % (FLUSH) 0.9 %
5-40 SYRINGE (ML) INJECTION AS NEEDED
Status: DISCONTINUED | OUTPATIENT
Start: 2019-01-23 | End: 2019-01-24 | Stop reason: HOSPADM

## 2019-01-23 RX ORDER — KETOROLAC TROMETHAMINE 30 MG/ML
30 INJECTION, SOLUTION INTRAMUSCULAR; INTRAVENOUS
Status: COMPLETED | OUTPATIENT
Start: 2019-01-23 | End: 2019-01-23

## 2019-01-23 RX ORDER — PROPRANOLOL HYDROCHLORIDE 10 MG/1
10 TABLET ORAL
COMMUNITY
End: 2019-01-23

## 2019-01-23 RX ORDER — LORAZEPAM 2 MG/ML
1 INJECTION INTRAMUSCULAR
Status: COMPLETED | OUTPATIENT
Start: 2019-01-23 | End: 2019-01-23

## 2019-01-23 RX ADMIN — SODIUM CHLORIDE 100 ML: 900 INJECTION, SOLUTION INTRAVENOUS at 21:52

## 2019-01-23 RX ADMIN — Medication 10 ML: at 21:52

## 2019-01-23 RX ADMIN — IOPAMIDOL 120 ML: 755 INJECTION, SOLUTION INTRAVENOUS at 21:53

## 2019-01-23 RX ADMIN — ACETAMINOPHEN 1000 MG: 500 TABLET ORAL at 22:25

## 2019-01-23 RX ADMIN — KETOROLAC TROMETHAMINE 30 MG: 30 INJECTION, SOLUTION INTRAMUSCULAR; INTRAVENOUS at 23:15

## 2019-01-23 RX ADMIN — Medication 5 ML: at 22:00

## 2019-01-23 RX ADMIN — LORAZEPAM 1 MG: 2 INJECTION INTRAMUSCULAR; INTRAVENOUS at 23:15

## 2019-01-23 NOTE — LETTER
[address] 
[address] [facility] [address] 
[phone] Work/School Note Date: 1/23/2019 To Whom It May concern: 
 
Elena Lynn was seen and treated today in the emergency room by the following Attending Provider: Griselda Hess, MD 
 
Elena Lynn may return to work on 1/27/19. Sincerely, Griselda Hess, MD

## 2019-01-24 VITALS
TEMPERATURE: 98.5 F | DIASTOLIC BLOOD PRESSURE: 61 MMHG | RESPIRATION RATE: 16 BRPM | OXYGEN SATURATION: 98 % | HEART RATE: 107 BPM | SYSTOLIC BLOOD PRESSURE: 101 MMHG

## 2019-01-24 LAB
AMPHET UR QL SCN: NEGATIVE
APPEARANCE UR: CLEAR
ATRIAL RATE: 94 BPM
BACTERIA URNS QL MICRO: ABNORMAL /HPF
BARBITURATES UR QL SCN: NEGATIVE
BENZODIAZ UR QL: NEGATIVE
BILIRUB UR QL: NEGATIVE
CALCULATED P AXIS, ECG09: 75 DEGREES
CALCULATED R AXIS, ECG10: 81 DEGREES
CALCULATED T AXIS, ECG11: 37 DEGREES
CANNABINOIDS UR QL SCN: NEGATIVE
COCAINE UR QL SCN: NEGATIVE
COLOR UR: ABNORMAL
DIAGNOSIS, 93000: NORMAL
DRUG SCRN COMMENT,DRGCM: NORMAL
EPITH CASTS URNS QL MICRO: ABNORMAL /LPF
GLUCOSE UR STRIP.AUTO-MCNC: NEGATIVE MG/DL
HGB UR QL STRIP: NEGATIVE
KETONES UR QL STRIP.AUTO: NEGATIVE MG/DL
LEUKOCYTE ESTERASE UR QL STRIP.AUTO: NEGATIVE
METHADONE UR QL: NEGATIVE
NITRITE UR QL STRIP.AUTO: NEGATIVE
OPIATES UR QL: NEGATIVE
P-R INTERVAL, ECG05: 158 MS
PCP UR QL: NEGATIVE
PH UR STRIP: 7 [PH] (ref 5–8)
PROT UR STRIP-MCNC: NEGATIVE MG/DL
Q-T INTERVAL, ECG07: 368 MS
QRS DURATION, ECG06: 94 MS
QTC CALCULATION (BEZET), ECG08: 460 MS
RBC #/AREA URNS HPF: ABNORMAL /HPF (ref 0–5)
SP GR UR REFRACTOMETRY: 1 (ref 1–1.03)
UROBILINOGEN UR QL STRIP.AUTO: 0.2 EU/DL (ref 0.2–1)
VENTRICULAR RATE, ECG03: 94 BPM
WBC URNS QL MICRO: ABNORMAL /HPF (ref 0–4)

## 2019-01-24 RX ORDER — KETOROLAC TROMETHAMINE 10 MG/1
10 TABLET, FILM COATED ORAL
Qty: 20 TAB | Refills: 0 | Status: SHIPPED | OUTPATIENT
Start: 2019-01-24

## 2019-01-24 NOTE — PROGRESS NOTES
Admission Medication Reconciliation: 
 
Information obtained from: Patient and her parents, Sherron Slater Significant PMH/Disease States:  
Past Medical History:  
Diagnosis Date  Abuse  Anxiety  Depression  ETOH abuse   
 sober since 7/2008  HPV in female  Insomnia Chief Complaint for this Admission:  Blurred vision Allergies:  Amoxicillin and Bactrim [sulfamethoprim ds] Prior to Admission Medications:  
Prior to Admission Medications Prescriptions Last Dose Informant Patient Reported? Taking? ARIPiprazole (ABILIFY) 15 mg tablet 1/23/2019 at Unknown time  Yes Yes Sig: Take 15 mg by mouth daily. ZINC PO 1/23/2019 at Unknown time  Yes Yes Sig: Take 1 Tab by mouth daily. Treating skin disorder  
ibuprofen (MOTRIN) 400 mg tablet 1/16/2019 at Unknown time  Yes Yes Sig: Take  by mouth every eight (8) hours as needed for Pain.  
propranolol (INDERAL) 10 mg tablet 1/23/2019 at Unknown time  No Yes Sig: TAKE 1 TABLET BY MOUTH 30-60 MIN PRIOR TO PRESENTATION, MAY TAKE TWICE DAILY IF NEEDED Facility-Administered Medications: None Comments/Recommendations: Patient and parents provided information. Note: 1. Antimicrobial therapy history: gets \"recurrent abscesses along underwear line several times a year\", which are lanced and treated with antibiotics. Most recently took Levaquin course (8/10 days) after MD told her that doxycycline was not working. \"Took a random dose\" (had left over, describes no unusual effects from having taken the first eight doses) 2. Zinc treats skin condition Thank you for allowing me to participate in the care of your patient. Pro Escobar PharmD, RN #[id number]

## 2019-01-24 NOTE — DISCHARGE INSTRUCTIONS
Patient Education        Numbness and Tingling: Care Instructions  Your Care Instructions    Many things can cause numbness or tingling. Swelling may put pressure on a nerve. This could cause you to lose feeling or have a pins-and-needles sensation on part of your body. Nerves may be damaged from trauma, toxins, or diseases, such as diabetes or multiple sclerosis (MS). Sometimes, though, the cause is not clear. If there is no clear reason for your symptoms, and you are not having any other symptoms, your doctor may suggest watching and waiting for a while to see if the numbness or tingling goes away on its own. Your doctor may want you to have blood or nerve tests to find the cause of your symptoms. Follow-up care is a key part of your treatment and safety. Be sure to make and go to all appointments, and call your doctor if you are having problems. It's also a good idea to know your test results and keep a list of the medicines you take. How can you care for yourself at home? · If your doctor prescribes medicine, take it exactly as directed. Call your doctor if you think you are having a problem with your medicine. · If you have any swelling, put ice or a cold pack on the area for 10 to 20 minutes at a time. Put a thin cloth between the ice and your skin. When should you call for help? Call 911 anytime you think you may need emergency care. For example, call if:    · You have weakness, numbness, or tingling in both legs.     · You lose bowel or bladder control.     · You have symptoms of a stroke. These may include:  ? Sudden numbness, tingling, weakness, or loss of movement in your face, arm, or leg, especially on only one side of your body. ? Sudden vision changes. ? Sudden trouble speaking. ? Sudden confusion or trouble understanding simple statements. ? Sudden problems with walking or balance.   ? A sudden, severe headache that is different from past headaches.    Watch closely for changes in your health, and be sure to contact your doctor if you have any problems, or if:    · You do not get better as expected. Where can you learn more? Go to http://kwasi-binta.info/. Enter Y313 in the search box to learn more about \"Numbness and Tingling: Care Instructions. \"  Current as of: Suzette 3, 2018  Content Version: 11.9  © 0064-2302 [employer]. Care instructions adapted under license by [employer] (which disclaims liability or warranty for this information). If you have questions about a medical condition or this instruction, always ask your healthcare professional. [address] any warranty or liability for your use of this information.

## 2019-01-24 NOTE — ED NOTES
10:49 PM 
Change of shift. Care of patient taken over from Dr. Leann Vincent to Dr. Carlita Mccann; H&P reviewed, bedside handoff complete. Awaiting labs. If negative, will be admitted to neurology for MRI.

## 2019-01-24 NOTE — ED PROVIDER NOTES
33 y.o. female with past medical history significant for Insomnia, Anxiety, Depression, and ETOH abuse who presents ambulatory from home with chief complaint of blurred vision. Patient states onset while sitting tonight at 2030 tonight of blurred vision and being unable to \"see far away\" which was followed by onset at 2100 of bilateral posterior leg numbness and tingling (R>L). Patient presents ambulatory to [facility] ED with persisting visual disturbance, bilateral posterior leg numbness and tingling, right sided facial numbness, bilateral upper extremity numbness and tingling (R>L), and eye redness. Per medical records, patient does not take a blood thinner daily. Pt denies fever, chills, cough, congestion, shortness of breath, chest pain, abdominal pain, nausea, vomiting, diarrhea, difficulty with urination or dysuria. There are no other acute medical concerns at this time. Note written by Nora Rosas, as dictated by Andrea Acosta MD 9:44 PM 
 
 
 
 
 
  
 
Past Medical History:  
Diagnosis Date  Abuse  Anxiety  Depression  ETOH abuse   
 sober since 7/2008  HPV in female  Insomnia Past Surgical History:  
Procedure Laterality Date  HX CYST INCISION AND DRAINAGE  1/2014 Boil/cyst on right buttock  HX OTHER SURGICAL  2015  
 cyst removed by left ear Family History:  
Problem Relation Age of Onset  Cancer Maternal Grandfather   
     lung  Cancer Paternal Grandmother   
     lung  No Known Problems Mother  No Known Problems Father Social History Socioeconomic History  Marital status: SINGLE Spouse name: Not on file  Number of children: Not on file  Years of education: Not on file  Highest education level: Not on file Social Needs  Financial resource strain: Not on file  Food insecurity - worry: Not on file  Food insecurity - inability: Not on file  Transportation needs - medical: Not on file  Transportation needs - non-medical: Not on file Occupational History  Not on file Tobacco Use  Smoking status: Current Some Day Smoker Packs/day: 1.00 Types: Cigarettes  Smokeless tobacco: Never Used Substance and Sexual Activity  Alcohol use: No  
 Drug use: No  
 Sexual activity: Yes  
  Partners: Male Birth control/protection: Pill Other Topics Concern  Not on file Social History Narrative  Not on file ALLERGIES: Amoxicillin and Bactrim [sulfamethoprim ds] Review of Systems Constitutional: Negative for chills and fever. HENT: Negative for congestion. Eyes: Positive for redness and visual disturbance. Respiratory: Negative for cough and shortness of breath. Cardiovascular: Negative for chest pain. Gastrointestinal: Negative for abdominal pain, diarrhea, nausea and vomiting. Genitourinary: Negative for difficulty urinating and dysuria. Musculoskeletal: Negative for gait problem. Neurological: Positive for numbness. All other systems reviewed and are negative. Vitals:  
 01/23/19 2140 Pulse: 83 SpO2: 100% Physical Exam  
Constitutional: She is oriented to person, place, and time. She appears well-developed. She appears distressed. HENT:  
Head: Normocephalic and atraumatic. Eyes: Conjunctivae and EOM are normal. Pupils are equal, round, and reactive to light. Neck: Normal range of motion. Neck supple. Cardiovascular: Normal rate and intact distal pulses. Pulmonary/Chest: Effort normal. No respiratory distress. Musculoskeletal: She exhibits tenderness. She exhibits no deformity. Soft tissues of legs Neurological: She is alert and oriented to person, place, and time. No cranial nerve deficit. Moving all extremities Psychiatric:  
Anxious, shaking Vitals reviewed. MDM Number of Diagnoses or Management Options Diagnosis management comments: Patient with acute vision changes and facial numbness and burning sensation in her legs. Code stroke initiated from triage - patient to CT for exams and them back to room for neuro consult Amount and/or Complexity of Data Reviewed Clinical lab tests: reviewed and ordered Tests in the radiology section of CPT®: ordered and reviewed Discuss the patient with other providers: yes Procedures CONSULT NOTE: 
10:01 PM Andrea Acosta, * spoke with Dr. Wanda Juarez, Consult for Teleneurology. Discussed available diagnostic tests and clinical findings. Dr. Wanda Juarez will evaluate patient. 11:04 PM 
Change of shift. Care of patient signed over to Dr. Ish Spaulding. Bedside handoff complete. Check labs and if patient improves re-eval for dispo, may consider admission if needed. VITALS:  
Patient Vitals for the past 8 hrs: 
 Temp Pulse Resp BP SpO2  
01/23/19 2214 98.3 °F (36.8 °C) 92 21 135/85 100 % 01/23/19 2140  83   100 % Recent Results (from the past 24 hour(s)) GLUCOSE, POC Collection Time: 01/23/19  9:42 PM  
Result Value Ref Range Glucose (POC) 94 65 - 100 mg/dL Performed by Parminder Martinez CBC WITH AUTOMATED DIFF Collection Time: 01/23/19 10:13 PM  
Result Value Ref Range WBC 12.5 (H) 3.6 - 11.0 K/uL  
 RBC 4.20 3.80 - 5.20 M/uL  
 HGB 13.3 11.5 - 16.0 g/dL HCT 40.3 35.0 - 47.0 % MCV 96.0 80.0 - 99.0 FL  
 MCH 31.7 26.0 - 34.0 PG  
 MCHC 33.0 30.0 - 36.5 g/dL  
 RDW 12.8 11.5 - 14.5 % PLATELET 326 319 - 967 K/uL MPV 9.9 8.9 - 12.9 FL  
 NRBC 0.0 0  WBC ABSOLUTE NRBC 0.00 0.00 - 0.01 K/uL NEUTROPHILS 85 (H) 32 - 75 % LYMPHOCYTES 14 12 - 49 % MONOCYTES 1 (L) 5 - 13 % EOSINOPHILS 0 0 - 7 % BASOPHILS 0 0 - 1 % IMMATURE GRANULOCYTES 1 (H) 0.0 - 0.5 % ABS. NEUTROPHILS 10.5 (H) 1.8 - 8.0 K/UL  
 ABS. LYMPHOCYTES 1.7 0.8 - 3.5 K/UL  
 ABS. MONOCYTES 0.1 0.0 - 1.0 K/UL  
 ABS. EOSINOPHILS 0.1 0.0 - 0.4 K/UL  
 ABS. BASOPHILS 0.0 0.0 - 0.1 K/UL ABS. IMM. GRANS. 0.1 (H) 0.00 - 0.04 K/UL  
 DF AUTOMATED    
POC INR Collection Time: 01/23/19 10:41 PM  
Result Value Ref Range INR (POC) 1.2 (H) <1.2 CT and CTA and CT perfusion - no evidence for stroke at this time. Neurologist suggested CK level and if needs further eval recommended MRI

## 2019-01-24 NOTE — ED TRIAGE NOTES
Patient presents to the emergency department escorted by boyfriend reporting blurred vision, bilateral leg pain, and numbness to the arms and back. Patient reports onset of blurred vision at 2030, and leg pain 2100. Code S level 1 was called upon arrival.  Patient brought the CT. Patient's blood sugar 94. Dr. Diana Perdomo assessed patient in CT. Patient is hyperventilating in triage. Patient continues to be anxious. Patient denies injury. Patient states she was \"just sitting\" when she began having symptoms.

## 2019-01-24 NOTE — PROGRESS NOTES
Responded to Code  Stroke in ER 6. Consulted with nurse. Provided support to family. Advised of  Availability. Chaplains will follow as able and/or needed. Emmy Redman,  Intern, MDiv 
[phone]

## 2019-02-19 ENCOUNTER — HOSPITAL ENCOUNTER (EMERGENCY)
Age: 29
Discharge: LEFT BEFORE DISCHARGE | End: 2019-02-19
Attending: EMERGENCY MEDICINE
Payer: MEDICAID

## 2019-02-19 VITALS — TEMPERATURE: 97.8 F | HEART RATE: 83 BPM | RESPIRATION RATE: 16 BRPM

## 2019-02-19 DIAGNOSIS — Z53.21 PATIENT LEFT AFTER TRIAGE: Primary | ICD-10-CM

## 2019-02-19 PROCEDURE — 75810000275 HC EMERGENCY DEPT VISIT NO LEVEL OF CARE

## 2019-02-19 NOTE — ED NOTES
Pt seen by registration walking out of room 5 and through the waiting room and to the parking lot. Pt stated on her way out the she \"tired of waiting and everyone was in a meeting\".

## 2019-03-22 ENCOUNTER — OFFICE VISIT (OUTPATIENT)
Dept: NEUROLOGY | Age: 29
End: 2019-03-22

## 2019-03-22 VITALS
BODY MASS INDEX: 28.26 KG/M2 | SYSTOLIC BLOOD PRESSURE: 108 MMHG | RESPIRATION RATE: 18 BRPM | HEART RATE: 70 BPM | OXYGEN SATURATION: 99 % | HEIGHT: 69 IN | WEIGHT: 190.8 LBS | DIASTOLIC BLOOD PRESSURE: 60 MMHG

## 2019-03-22 DIAGNOSIS — H53.9 VISION CHANGES: Primary | ICD-10-CM

## 2019-03-22 DIAGNOSIS — M79.10 MYALGIA: ICD-10-CM

## 2019-03-22 NOTE — LETTER
3/22/2019 Patient:  Tao Yepez YOB: 1990 Date of Visit: 3/22/2019 Dear Kimberly Manley MD 
[facility] [address] [facility] [address] [address] VIA Facsimile: [phone] 
 : 
 
 
I was requested by Alfred Lorenzo MD to evaluate Ms. Karina Darnell  for Chief Complaint Patient presents with  Blurred Vision  Leg Pain Annamary Ripa I am recommending the following:  
 
Diagnoses and all orders for this visit: 1. Vision changes 2. Myalgia 
 
 
 
---------------------------------------------------------------------------------------------------------------------- Below is my encounter: Chief Complaint Patient presents with  Blurred Vision  Leg Pain Referred by: Dr. 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 On January 23, 2019 she was sitting with friends and suddenly developed bilateral blurry vision. She could still see but it was not clear. No headache, double vision, nausea. This lasted about 3 or 4 hours. She got extremely worried and went to the emergency room. She was evaluated appropriately. She had a stroke evaluation done to include CT head, CT perfusion, CTA all of which were benign and unrevealing for any acute issue like aneurysm or dissection or stroke. While she was waiting in the emergency room she also felt diffuse sudden painful cramping in her legs radiate up her body to her arms also transiently. Ultimately she was discharged. Symptoms have not returned. She does take zinc daily for skin condition. Coincidently the day of symptoms as described she had finished a course of Levaquin. Review of Systems Constitutional: Negative for malaise/fatigue. Eyes: Positive for blurred vision. Musculoskeletal: Positive for myalgias. Neurological: Negative for seizures and loss of consciousness. All other systems reviewed and are negative. Past Medical History:  
Diagnosis Date  Abuse  Anxiety  Depression  ETOH abuse   
 sober since 7/2008  HPV in female  Insomnia Family History Problem Relation Age of Onset  Cancer Maternal Grandfather   
     lung  Cancer Paternal Grandmother   
     lung  No Known Problems Mother  No Known Problems Father Social History Socioeconomic History  Marital status: SINGLE Spouse name: Not on file  Number of children: Not on file  Years of education: Not on file  Highest education level: Not on file Occupational History  Not on file Social Needs  Financial resource strain: Not on file  Food insecurity:  
  Worry: Not on file Inability: Not on file  Transportation needs:  
  Medical: Not on file Non-medical: Not on file Tobacco Use  Smoking status: Former Smoker Packs/day: 0.00 Types: Cigarettes  Smokeless tobacco: Current User Substance and Sexual Activity  Alcohol use: No  
 Drug use: No  
 Sexual activity: Yes  
  Partners: Male Birth control/protection: Pill Lifestyle  Physical activity:  
  Days per week: Not on file Minutes per session: Not on file  Stress: Not on file Relationships  Social connections:  
  Talks on phone: Not on file Gets together: Not on file Attends Anabaptism service: Not on file Active member of club or organization: Not on file Attends meetings of clubs or organizations: Not on file Relationship status: Not on file  Intimate partner violence:  
  Fear of current or ex partner: Not on file Emotionally abused: Not on file Physically abused: Not on file Forced sexual activity: Not on file Other Topics Concern  Not on file Social History Narrative  Not on file Current Outpatient Medications Medication Sig  ARIPiprazole (ABILIFY) 15 mg tablet Take 15 mg by mouth daily.  ibuprofen (MOTRIN) 400 mg tablet Take  by mouth every eight (8) hours as needed for Pain.  ZINC PO Take 1 Tab by mouth daily. Treating skin disorder  propranolol (INDERAL) 10 mg tablet TAKE 1 TABLET BY MOUTH 30-60 MIN PRIOR TO PRESENTATION, MAY TAKE TWICE DAILY IF NEEDED  
 ketorolac (TORADOL) 10 mg tablet Take 1 Tab by mouth every six (6) hours as needed for Pain. No current facility-administered medications for this visit. Allergies Allergen Reactions  Amoxicillin Rash  Bactrim [Sulfamethoprim Ds] Rash Neurologic Exam  
 
Mental Status Oriented to person, place, and time. Cranial Nerves Cranial nerves II through XII intact. Motor Exam  
Muscle bulk: normal 
 
Strength Strength 5/5 throughout. Sensory Exam  
Light touch normal.  
 
Gait, Coordination, and Reflexes Gait Gait: normal 
 
Coordination Romberg: negative Finger to nose coordination: normal 
 
Reflexes Right brachioradialis: 2+ Left brachioradialis: 2+ Right biceps: 2+ Left biceps: 2+ Right triceps: 2+ Left triceps: 2+ Right patellar: 3+ Left patellar: 3+ Right achilles: 2+ Left achilles: 2+ Physical Exam  
Constitutional: She is oriented to person, place, and time. She appears well-developed and well-nourished. Cardiovascular: Normal rate. Pulmonary/Chest: Effort normal.  
Neurological: She is oriented to person, place, and time. She has normal strength. She has a normal Finger-Nose-Finger Test and a normal Romberg Test. Gait normal.  
Reflex Scores: 
     Tricep reflexes are 2+ on the right side and 2+ on the left side. Bicep reflexes are 2+ on the right side and 2+ on the left side. Brachioradialis reflexes are 2+ on the right side and 2+ on the left side. Patellar reflexes are 3+ on the right side and 3+ on the left side. Achilles reflexes are 2+ on the right side and 2+ on the left side. Skin: Skin is warm and dry. Psychiatric: Her behavior is normal.  
Vitals reviewed. Visit Vitals /60 Pulse 70 Resp 18 Ht 5' 9\" (1.753 m) Wt 86.5 kg (190 lb 12.8 oz) LMP 10/12/2018 SpO2 99% BMI 28.18 kg/m² Lab Results Component Value Date/Time WBC 12.5 (H) 01/23/2019 10:13 PM  
 HGB 13.3 01/23/2019 10:13 PM  
 Hemoglobin (POC) 15.0 12/19/2016 04:02 PM  
 HCT 40.3 01/23/2019 10:13 PM  
 Hematocrit (POC) 54 (H) 01/23/2019 11:34 PM  
 PLATELET 340 84/81/3373 10:13 PM  
 MCV 96.0 01/23/2019 10:13 PM  
 
Lab Results Component Value Date/Time Glucose 92 01/23/2019 10:13 PM  
 Glucose (POC) 112 (H) 01/23/2019 11:34 PM  
 Glucose (POC) 94 01/23/2019 09:42 PM  
 LDL, calculated 111 11/25/2013 11:41 AM  
 Creatinine (POC) 0.8 01/23/2019 11:34 PM  
 Creatinine 0.88 01/23/2019 10:13 PM  
  
Lab Results Component Value Date/Time Cholesterol, total 178 11/25/2013 11:41 AM  
 HDL Cholesterol 52 11/25/2013 11:41 AM  
 LDL, calculated 111 11/25/2013 11:41 AM  
 Triglyceride 74 11/25/2013 11:41 AM  
 CHOL/HDL Ratio 3.0 03/27/2009 10:30 AM  
 
Lab Results Component Value Date/Time ALT (SGPT) 29 01/23/2019 10:13 PM  
 AST (SGOT) 15 01/23/2019 10:13 PM  
 Alk. phosphatase 56 01/23/2019 10:13 PM  
 Bilirubin, total 0.3 01/23/2019 10:13 PM  
 Albumin 3.7 01/23/2019 10:13 PM  
 Protein, total 7.1 01/23/2019 10:13 PM  
 INR (POC) 1.2 (H) 01/23/2019 10:41 PM  
 PLATELET 226 43/26/5291 10:13 PM  
  
 
CT Results (maximum last 3): Results from Hospital Encounter encounter on 01/23/19 CT CODE NEURO HEAD WO CONTRAST Narrative EXAM:  CT code neuro head without contrast 
 
INDICATION: Facial numbness and acute vision changes COMPARISON: CT 7/15/2008 TECHNIQUE: Noncontrast head CT. Coronal and sagittal reformats. CT dose 
reduction was achieved through use of a standardized protocol tailored for this 
examination and automatic exposure control for dose modulation. Adaptive statistical iterative reconstruction (ASIR) was utilized. FINDINGS: The ventricles and sulci are age-appropriate without hydrocephalus. There is no mass effect or midline shift. There is no intracranial hemorrhage or 
extra-axial fluid collection. There is no abnormal area of decreased density to 
suggest infarct. The gray-white matter differentiation is maintained. The basal 
cisterns are patent. The osseous structures are intact. The visualized paranasal sinuses and mastoid 
air cells are clear. Impression IMPRESSION:  
 
No acute intracranial abnormality. CT CODE NEURO PERF W CBF Narrative *PRELIMINARY REPORT* No large vessel occlusion or significant flow-limiting stenosis in the head or 
neck. Normal perfusion. Preliminary report was provided by Dr. Atif Marrufo, the on-call radiologist, on 
1/23/2019 at 2220 hours. Final report to follow. *END PRELIMINARY REPORT* INDICATION: Blurred vision. Contrast-enhanced CT angiogram head and neck performed using 100 cc Isovue-370. Three-dimensional postprocessing was performed. CT perfusion analysis using computed tomography with contrast administration 
including postprocessing of parametric maps with determination of cerebral blood 
flow, cerebral blood volume, and mean transit time was also performed. CT dose reduction was achieved through use of a standardized protocol tailored 
for this examination and are automatic exposure control for dose modulation dose 
reduction NECK: 
 
The origins of the great vessels are patent. Both vertebral arteries are patent. Both carotid arteries are patent. There is no significant stenosis using NASCET 
criteria. Head: 
 
Major intracranial vessels are patent. No large vessel occlusion or intracranial 
aneurysm. No evidence for intracranial hemorrhage or acute stroke. The 
underlying brain is unremarkable. Perfusion imaging demonstrates symmetric intracranial perfusion. Annamary Ripa Impression IMPRESSION: No acute abnormality. Negative CTA head and neck Assessment and Plan Diagnoses and all orders for this visit: 1. Vision changes 2. Myalgia 67-year-old woman who had transient vision changes coupled with painful symptoms bilaterally. Symptoms are difficult to localize. I reviewed the imaging done which was all appropriate. She had a good workup. I discussed this with her personally. She did not have a stroke or has any concern for aneurysm. This has not recurred since. I have suspicion if Levaquin might of induce this. Overall she is doing well with a normal exam.  I would defer additional imaging. We talked about her zinc use. Chronic zinc supplementation could lead to copper deficiency over time which could lead to a neurologic disorder of myelopathy. I would have her discuss with her dermatologist how long she needs to take zinc.  Also for this intermittent cramping in her legs I think will be reasonable to start taking magnesium supplements every day. She is going to try that. Questions answered. Follow-up here as needed. Thank you for giving me the opportunity to assist in the care of Ms. Reyna Kaye. If you have questions, please do not hesitate to contact me. Sincerely, [facility], DO Neurologist 
Brain Injury Medicine Diplomate [place]

## 2019-03-22 NOTE — PROGRESS NOTES
Chief Complaint Patient presents with  Blurred Vision  Leg Pain Referred by: Dr. 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 On January 23, 2019 she was sitting with friends and suddenly developed bilateral blurry vision. She could still see but it was not clear. No headache, double vision, nausea. This lasted about 3 or 4 hours. She got extremely worried and went to the emergency room. She was evaluated appropriately. She had a stroke evaluation done to include CT head, CT perfusion, CTA all of which were benign and unrevealing for any acute issue like aneurysm or dissection or stroke. While she was waiting in the emergency room she also felt diffuse sudden painful cramping in her legs radiate up her body to her arms also transiently. Ultimately she was discharged. Symptoms have not returned. She does take zinc daily for skin condition. Coincidently the day of symptoms as described she had finished a course of Levaquin. Review of Systems Constitutional: Negative for malaise/fatigue. Eyes: Positive for blurred vision. Musculoskeletal: Positive for myalgias. Neurological: Negative for seizures and loss of consciousness. All other systems reviewed and are negative. Past Medical History:  
Diagnosis Date  Abuse  Anxiety  Depression  ETOH abuse   
 sober since 7/2008  HPV in female  Insomnia Family History Problem Relation Age of Onset  Cancer Maternal Grandfather   
     lung  Cancer Paternal Grandmother   
     lung  No Known Problems Mother  No Known Problems Father Social History Socioeconomic History  Marital status: SINGLE Spouse name: Not on file  Number of children: Not on file  Years of education: Not on file  Highest education level: Not on file Occupational History  Not on file Social Needs  Financial resource strain: Not on file  Food insecurity:  
  Worry: Not on file Inability: Not on file  Transportation needs:  
  Medical: Not on file Non-medical: Not on file Tobacco Use  Smoking status: Former Smoker Packs/day: 0.00 Types: Cigarettes  Smokeless tobacco: Current User Substance and Sexual Activity  Alcohol use: No  
 Drug use: No  
 Sexual activity: Yes  
  Partners: Male Birth control/protection: Pill Lifestyle  Physical activity:  
  Days per week: Not on file Minutes per session: Not on file  Stress: Not on file Relationships  Social connections:  
  Talks on phone: Not on file Gets together: Not on file Attends Sikhism service: Not on file Active member of club or organization: Not on file Attends meetings of clubs or organizations: Not on file Relationship status: Not on file  Intimate partner violence:  
  Fear of current or ex partner: Not on file Emotionally abused: Not on file Physically abused: Not on file Forced sexual activity: Not on file Other Topics Concern  Not on file Social History Narrative  Not on file Current Outpatient Medications Medication Sig  ARIPiprazole (ABILIFY) 15 mg tablet Take 15 mg by mouth daily.  ibuprofen (MOTRIN) 400 mg tablet Take  by mouth every eight (8) hours as needed for Pain.  ZINC PO Take 1 Tab by mouth daily. Treating skin disorder  propranolol (INDERAL) 10 mg tablet TAKE 1 TABLET BY MOUTH 30-60 MIN PRIOR TO PRESENTATION, MAY TAKE TWICE DAILY IF NEEDED  
 ketorolac (TORADOL) 10 mg tablet Take 1 Tab by mouth every six (6) hours as needed for Pain. No current facility-administered medications for this visit. Allergies Allergen Reactions  Amoxicillin Rash  Bactrim [Sulfamethoprim Ds] Rash Neurologic Exam  
 
Mental Status Oriented to person, place, and time. Cranial Nerves Cranial nerves II through XII intact.   
 
Motor Exam  
 Muscle bulk: normal 
 
Strength Strength 5/5 throughout. Sensory Exam  
Light touch normal.  
 
Gait, Coordination, and Reflexes Gait Gait: normal 
 
Coordination Romberg: negative Finger to nose coordination: normal 
 
Reflexes Right brachioradialis: 2+ Left brachioradialis: 2+ Right biceps: 2+ Left biceps: 2+ Right triceps: 2+ Left triceps: 2+ Right patellar: 3+ Left patellar: 3+ Right achilles: 2+ Left achilles: 2+ Physical Exam  
Constitutional: She is oriented to person, place, and time. She appears well-developed and well-nourished. Cardiovascular: Normal rate. Pulmonary/Chest: Effort normal.  
Neurological: She is oriented to person, place, and time. She has normal strength. She has a normal Finger-Nose-Finger Test and a normal Romberg Test. Gait normal.  
Reflex Scores: 
     Tricep reflexes are 2+ on the right side and 2+ on the left side. Bicep reflexes are 2+ on the right side and 2+ on the left side. Brachioradialis reflexes are 2+ on the right side and 2+ on the left side. Patellar reflexes are 3+ on the right side and 3+ on the left side. Achilles reflexes are 2+ on the right side and 2+ on the left side. Skin: Skin is warm and dry. Psychiatric: Her behavior is normal.  
Vitals reviewed. Visit Vitals /60 Pulse 70 Resp 18 Ht 5' 9\" (1.753 m) Wt 86.5 kg (190 lb 12.8 oz) LMP 10/12/2018 SpO2 99% BMI 28.18 kg/m² Lab Results Component Value Date/Time WBC 12.5 (H) 01/23/2019 10:13 PM  
 HGB 13.3 01/23/2019 10:13 PM  
 Hemoglobin (POC) 15.0 12/19/2016 04:02 PM  
 HCT 40.3 01/23/2019 10:13 PM  
 Hematocrit (POC) 54 (H) 01/23/2019 11:34 PM  
 PLATELET 996 83/32/3796 10:13 PM  
 MCV 96.0 01/23/2019 10:13 PM  
 
Lab Results Component Value Date/Time  Glucose 92 01/23/2019 10:13 PM  
 Glucose (POC) 112 (H) 01/23/2019 11:34 PM  
 Glucose (POC) 94 01/23/2019 09:42 PM  
 LDL, calculated 111 11/25/2013 11:41 AM  
 Creatinine (POC) 0.8 01/23/2019 11:34 PM  
 Creatinine 0.88 01/23/2019 10:13 PM  
  
Lab Results Component Value Date/Time Cholesterol, total 178 11/25/2013 11:41 AM  
 HDL Cholesterol 52 11/25/2013 11:41 AM  
 LDL, calculated 111 11/25/2013 11:41 AM  
 Triglyceride 74 11/25/2013 11:41 AM  
 CHOL/HDL Ratio 3.0 03/27/2009 10:30 AM  
 
Lab Results Component Value Date/Time ALT (SGPT) 29 01/23/2019 10:13 PM  
 AST (SGOT) 15 01/23/2019 10:13 PM  
 Alk. phosphatase 56 01/23/2019 10:13 PM  
 Bilirubin, total 0.3 01/23/2019 10:13 PM  
 Albumin 3.7 01/23/2019 10:13 PM  
 Protein, total 7.1 01/23/2019 10:13 PM  
 INR (POC) 1.2 (H) 01/23/2019 10:41 PM  
 PLATELET 421 90/88/6419 10:13 PM  
 
Lab Results Component Value Date/Time INR (POC) 1.2 (H) 01/23/2019 10:41 PM  
   
 
CT Results (maximum last 3): Results from Hospital Encounter encounter on 01/23/19 CT CODE NEURO HEAD WO CONTRAST Narrative EXAM:  CT code neuro head without contrast 
 
INDICATION: Facial numbness and acute vision changes COMPARISON: CT 7/15/2008 TECHNIQUE: Noncontrast head CT. Coronal and sagittal reformats. CT dose 
reduction was achieved through use of a standardized protocol tailored for this 
examination and automatic exposure control for dose modulation. Adaptive 
statistical iterative reconstruction (ASIR) was utilized. FINDINGS: The ventricles and sulci are age-appropriate without hydrocephalus. There is no mass effect or midline shift. There is no intracranial hemorrhage or 
extra-axial fluid collection. There is no abnormal area of decreased density to 
suggest infarct. The gray-white matter differentiation is maintained. The basal 
cisterns are patent. The osseous structures are intact. The visualized paranasal sinuses and mastoid 
air cells are clear. Impression IMPRESSION:  
 
No acute intracranial abnormality. CT CODE NEURO PERF W CBF Narrative *PRELIMINARY REPORT* No large vessel occlusion or significant flow-limiting stenosis in the head or 
neck. Normal perfusion. Preliminary report was provided by Dr. Atif Marrufo, the on-call radiologist, on 
1/23/2019 at 2220 hours. Final report to follow. *END PRELIMINARY REPORT* INDICATION: Blurred vision. Contrast-enhanced CT angiogram head and neck performed using 100 cc Isovue-370. Three-dimensional postprocessing was performed. CT perfusion analysis using computed tomography with contrast administration 
including postprocessing of parametric maps with determination of cerebral blood 
flow, cerebral blood volume, and mean transit time was also performed. CT dose reduction was achieved through use of a standardized protocol tailored 
for this examination and are automatic exposure control for dose modulation dose 
reduction NECK: 
 
The origins of the great vessels are patent. Both vertebral arteries are patent. Both carotid arteries are patent. There is no significant stenosis using NASCET 
criteria. Head: 
 
Major intracranial vessels are patent. No large vessel occlusion or intracranial 
aneurysm. No evidence for intracranial hemorrhage or acute stroke. The 
underlying brain is unremarkable. Perfusion imaging demonstrates symmetric intracranial perfusion. Annamary Ripa Impression IMPRESSION: No acute abnormality. Negative CTA head and neck Assessment and Plan Diagnoses and all orders for this visit: 1. Vision changes 2. Myalgia 26-year-old woman who had transient vision changes coupled with painful symptoms bilaterally. Symptoms are difficult to localize. I reviewed the imaging done which was all appropriate. She had a good workup. I discussed this with her personally. She did not have a stroke or has any concern for aneurysm. This has not recurred since. I have suspicion if Levaquin might of induce this.   Overall she is doing well with a normal exam.  I would defer additional imaging. We talked about her zinc use. Chronic zinc supplementation could lead to copper deficiency over time which could lead to a neurologic disorder of myelopathy. I would have her discuss with her dermatologist how long she needs to take zinc.  Also for this intermittent cramping in her legs I think will be reasonable to start taking magnesium supplements every day. She is going to try that. Questions answered. Follow-up here as needed. A notice of this visit/encounter being completed has been sent electronically to the patient's PCP and/or referring provider. [facility],  
NEUROLOGIST Diplomate [place]

## 2019-03-22 NOTE — PATIENT INSTRUCTIONS
Magnesium (By mouth) Used as a mineral supplement to add to the magnesium in your daily diet. Brand Name(s): CalMag Thins, Coral Calcium, GNC Calcium/Magnesium with Vitamin D, Leader Magnesium, MG Plus Protein, MP Magnesium, Mag-G, Mag-Tab SR, Magimin-Forte, Maginex, Magnacaps, PharmAssure Magnesium, Danny Portal, [employer] Magnesium, [employer] Natural Magnesium There may be other brand names for this medicine. When This Medicine Should Not Be Used:  
Unless your doctor tells you otherwise, there is no reason for you to not use this medicine. How to Use This Medicine:  
Powder for Suspension, Liquid Filled Capsule, Capsule, Powder, Liquid, Tablet, Packet, Long Acting Tablet · Your doctor will tell you how much medicine to use. Do not use more than directed. · Follow the instructions on the medicine label if you are using this medicine without a prescription. · Drink at least six to eight (8 ounce) cups of liquid each day, unless your doctor tells you otherwise. · Measure the oral liquid medicine with a marked measuring spoon, oral syringe, or medicine cup. If a dose is missed: · Take a dose as soon as you remember. If it is almost time for your next dose, wait until then and take a regular dose. Do not take extra medicine to make up for a missed dose. How to Store and Dispose of This Medicine: · Store the medicine in a closed container at room temperature, away from heat, moisture, and direct light. · Keep all medicine out of the reach of children. Never share your medicine with anyone. · Ask your pharmacist, doctor, or health caregiver about the best way to dispose of any outdated medicine or medicine no longer needed. Drugs and Foods to Avoid: Ask your doctor or pharmacist before using any other medicine, including over-the-counter medicines, vitamins, and herbal products.  
· There are other medicines that may not work properly if you use them together with magnesium. Make sure your doctor knows about all other medicines you are using. Warnings While Using This Medicine: · Make sure your doctor knows if you are pregnant or breast feeding, or if you have kidney disease. Possible Side Effects While Using This Medicine: If you notice these less serious side effects, talk with your doctor: · Nausea, diarrhea. If you notice other side effects that you think are caused by this medicine, tell your doctor. Call your doctor for medical advice about side effects. You may report side effects to FDA at 6-229-TMW-5829 © 2017 [facility] Information is for End User's use only and may not be sold, redistributed or otherwise used for commercial purposes. The above information is an  only. It is not intended as medical advice for individual conditions or treatments. Talk to your doctor, nurse or pharmacist before following any medical regimen to see if it is safe and effective for you.

## 2019-03-22 NOTE — LETTER
3/22/19 Patient: Lorenzo Jaime YOB: 1990 Date of Visit: 3/22/2019 Paulie Hernández MD 
[place] [id number] VIA Facsimile: [phone] Dear Paulie Hernández MD, Thank you for referring Ms. Suzie Mcneil to [address] [place] [address] for evaluation. My notes for this consultation are attached. If you have questions, please do not hesitate to call me. I look forward to following your patient along with you. Sincerely, [address] [facility], DO

## 2019-08-23 ENCOUNTER — OFFICE VISIT (OUTPATIENT)
Dept: SLEEP MEDICINE | Age: 29
End: 2019-08-23

## 2019-08-23 VITALS
DIASTOLIC BLOOD PRESSURE: 80 MMHG | HEIGHT: 69 IN | WEIGHT: 186 LBS | OXYGEN SATURATION: 99 % | HEART RATE: 70 BPM | SYSTOLIC BLOOD PRESSURE: 119 MMHG | BODY MASS INDEX: 27.55 KG/M2

## 2019-08-23 DIAGNOSIS — F41.9 ANXIETY AND DEPRESSION: ICD-10-CM

## 2019-08-23 DIAGNOSIS — F51.5 NIGHTMARES: ICD-10-CM

## 2019-08-23 DIAGNOSIS — G47.33 OSA (OBSTRUCTIVE SLEEP APNEA): Primary | ICD-10-CM

## 2019-08-23 DIAGNOSIS — F32.A ANXIETY AND DEPRESSION: ICD-10-CM

## 2019-08-23 RX ORDER — CHOLECALCIFEROL (VITAMIN D3) 125 MCG
10 CAPSULE ORAL
COMMUNITY

## 2019-08-23 RX ORDER — PRAZOSIN HYDROCHLORIDE 1 MG/1
CAPSULE ORAL
Qty: 45 CAP | Refills: 2 | Status: SHIPPED | OUTPATIENT
Start: 2019-08-23

## 2019-08-23 RX ORDER — MIRTAZAPINE 15 MG/1
TABLET, FILM COATED ORAL
COMMUNITY

## 2019-08-23 RX ORDER — PRAZOSIN HYDROCHLORIDE 2 MG/1
2 CAPSULE ORAL
Qty: 30 CAP | Refills: 2 | Status: SHIPPED | OUTPATIENT
Start: 2019-08-23 | End: 2019-08-23 | Stop reason: SDUPTHER

## 2019-08-23 NOTE — PATIENT INSTRUCTIONS
[address] [facility] [place]. [place], [address]  Tel.  [phone]  Fax. 100 [facility] 60  [place], [address]  Tel.  [phone]  Fax. [phone] [address]  Tel.  [phone]  Fax. [phone]       Narcolepsy: After Your Visit  Your Care Instructions  Everybody gets a little sleepy once in a while, during a long car ride or other times when you want to be alert. But some people cannot control their sleepiness. It is no fun to be in the middle of your workday or driving your car down the street and have an overwhelming desire to sleep. This condition is called narcolepsy. Doctors do not know what causes narcolepsy. Your doctor may ask you to keep a sleep diary for a couple of weeks. It will help you and your doctor decide on treatment. It often helps to take limited naps during the day. It also helps to create a good mood and place for nighttime sleep. Your doctor may recommend medicine to help you stay awake during the day or sleep at night. Follow-up care is a key part of your treatment and safety. Be sure to make and go to all appointments, and call your doctor if you are having problems. It's also a good idea to know your test results and keep a list of the medicines you take. How can you care for yourself at home? · Try to take 2 or 3 short naps at regular times during the day. After a nap, always give yourself time to become alert before you drive a car or do anything that might cause an accident. · Take your medicines exactly as prescribed. Call your doctor if you think you are having a problem with your medicine. You may need to try several medicines before you find the one that works best for you. · Try to improve your nighttime sleep habits. Here are a few of the things you could do:  ¨ Go to bed only when you are sleepy, and get up at the same time every day, even if you do not feel rested.  This might help you sleep well the next night and the night after that. ¨ If you lie awake for longer than 15 minutes, get up, leave the bedroom, and do something quiet, such as read, until you feel sleepy again. ¨ Avoid drinking or eating anything with caffeine after 3 p.m. This includes coffee, tea, cola drinks, and chocolate. ¨ Make sure your bedroom is not too hot or too cold, and keep it quiet and dark. ¨ Make sure your mattress provides good support. · Be kind to your body:  ¨ Relieve tension with exercise or a massage. ¨ Learn and do relaxation techniques. ¨ Avoid alcohol, caffeine, nicotine, and illegal drugs. They can increase your anxiety level and cause sleep problems. · Get light exercise daily. Gentle stretching, light aerobics, swimming, walking, and riding a bicycle can help to keep you going during the day and to sleep well at night. · Eat a healthy diet. You may feel better if you avoid heavy meals and eat more fruits and vegetables. · Do not use over-the-counter sleeping pills. They can make your sleep restless. · Ask your doctor if any medicines you take could cause sleepiness. For example, cold and allergy medicines can make you drowsy. · Consider joining a support group with people who have narcolepsy or other sleep problems. These groups can be a good source of tips for what to do. Also, it can be comforting to talk to people who face similar challenges. Your doctor can tell you how to contact a support group. When should you call for help? Call your doctor now or seek immediate medical care if:  · You passed out (lost consciousness). · You cannot use your muscles. This may happen very briefly, sometimes after you laugh or are angry, and may only affect part of your body. Watch closely for changes in your health, and be sure to contact your doctor if:  · Your sleepiness continues to get worse. Where can you learn more?    Go to Gobbler.be  Enter V069 in the search box to learn more about \"Narcolepsy: After Your Visit. \"   © 8301-2701 Healthwise, Incorporated. Care instructions adapted under license by [employer] (which disclaims liability or warranty for this information). This care instruction is for use with your licensed healthcare professional. If you have questions about a medical condition or this instruction, always ask your healthcare professional. [address] any warranty or liability for your use of this information. Content Version: 9.0.82069; Last Revised: September 15, 2009  PROPER SLEEP HYGIENE    What to avoid  · Do not have drinks with caffeine, such as coffee or black tea, for 8 hours before bed. · Do not smoke or use other types of tobacco near bedtime. Nicotine is a stimulant and can keep you awake. · Avoid drinking alcohol late in the evening, because it can cause you to wake in the middle of the night. · Do not eat a big meal close to bedtime. If you are hungry, eat a light snack. · Do not drink a lot of water close to bedtime, because the need to urinate may wake you up during the night. · Do not read or watch TV in bed. Use the bed only for sleeping and sexual activity. What to try  · Go to bed at the same time every night, and wake up at the same time every morning. Do not take naps during the day. · Keep your bedroom quiet, dark, and cool. · Get regular exercise, but not within 3 to 4 hours of your bedtime. .  · Sleep on a comfortable pillow and mattress. · If watching the clock makes you anxious, turn it facing away from you so you cannot see the time. · If you worry when you lie down, start a worry book. Well before bedtime, write down your worries, and then set the book and your concerns aside. · Try meditation or other relaxation techniques before you go to bed. · If you cannot fall asleep, get up and go to another room until you feel sleepy. Do something relaxing.  Repeat your bedtime routine before you go to bed again.  · Make your house quiet and calm about an hour before bedtime. Turn down the lights, turn off the TV, log off the computer, and turn down the volume on music. This can help you relax after a busy day. Drowsy Driving: The [employer] cites drowsiness as a causing factor in more than 762,768 police reported crashes annually, resulting in 76,000 injuries and 1,500 deaths. Other surveys suggest 55% of people polled have driven while drowsy in the past year, 23% had fallen asleep but not crashed, 3% crashed, and 2% had and accident due to drowsy driving. Who is at risk? Young Drivers: One study of drowsy driving accidents states that 55% of the drivers were under 25 years. Of those, 75% were male. Shift Workers and Travelers: People who work overnight or travel across time zones frequently are at higher risk of experiencing Circadian Rhythm Disorders. They are trying to work and function when their body is programed to sleep. Sleep Deprived: Lack of sleep has a serious impact on your ability to pay attention or focus on a task. Consistently getting less than the average of 8 hours your body needs creates partial or cumulative sleep deprivation. Untreated Sleep Disorders: Sleep Apnea, Narcolepsy, R.L.S., and other sleep disorders (untreated) prevent a person from getting enough restful sleep. This leads to excessive daytime sleepiness and increases the risk for drowsy driving accidents by up to 7 times. Medications / Alcohol: Even over the counter medications can cause drowsiness. Medications that impair a drivers attention should have a warning label. Alcohol naturally makes you sleepy and on its own can cause accidents. Combined with excessive drowsiness its effects are amplified.    Signs of Drowsy Driving:   * You don't remember driving the last few miles   * You may drift out of your dustin   * You are unable to focus and your thoughts wander   * You may yawn more often than normal   * You have difficulty keeping your eyes open / nodding off   * Missing traffic signs, speeding, or tailgating  Prevention-   Good sleep hygiene, lifestyle and behavioral choices have the most impact on drowsy driving. There is no substitute for sleep and the average person requires 8 hours nightly. If you find yourself driving drowsy, stop and sleep. Consider the sleep hygiene tips provided during your visit as well. Medication Refill Policy: Refills for all medications require 1 week advance notice. Please have your pharmacy fax a refill request. We are unable to fax, or call in \"controled substance\" medications and you will need to pick these prescriptions up from our office. [employer] Activation    Thank you for requesting access to [facility]. Please follow the instructions below to securely access and download your online medical record. [facility] allows you to send messages to your doctor, view your test results, renew your prescriptions, schedule appointments, and more. How Do I Sign Up? 1. In your internet browser, go to [email or web address]. 2. Click on the First Time User? Click Here link in the Sign In box. You will see the New Member Sign Up page. 3. Enter your [facility] Access Code exactly as it appears below. You will not need to use this code after youve completed the sign-up process. If you do not sign up before the expiration date, you must request a new code. [facility] Access Code: Activation code not generated  Current [facility] Status: Active (This is the date your [facility] access code will )    4. Enter the last four digits of your Social Security Number (xxxx) and Date of Birth (mm/dd/yyyy) as indicated and click Submit. You will be taken to the next sign-up page. 5. Create a [facility] ID. This will be your [facility] login ID and cannot be changed, so think of one that is secure and easy to remember. 6. Create a [facility] password.  You can change your password at any time. 7. Enter your Password Reset Question and Answer. This can be used at a later time if you forget your password. 8. Enter your e-mail address. You will receive e-mail notification when new information is available in [address] 9. Click Sign Up. You can now view and download portions of your medical record. 10. Click the Download Summary menu link to download a portable copy of your medical information. Additional Information    If you have questions, please call [phone]. Remember, [employer] is NOT to be used for urgent needs. For medical emergencies, dial 911.
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
## 2019-10-16 ENCOUNTER — TELEPHONE (OUTPATIENT)
Dept: SLEEP MEDICINE | Age: 29
End: 2019-10-16

## 2019-10-16 DIAGNOSIS — G47.33 OSA (OBSTRUCTIVE SLEEP APNEA): Primary | ICD-10-CM

## 2019-11-06 NOTE — TELEPHONE ENCOUNTER
Orders Placed This Encounter    SPLIT CPAP/PSG     Standing Status:   Future     Standing Expiration Date:   5/6/2020     Order Specific Question:   Reason for Exam     Answer:   EHSAN

## 2021-04-09 ENCOUNTER — HOSPITAL ENCOUNTER (EMERGENCY)
Age: 31
Discharge: HOME | End: 2021-04-09
Attending: EMERGENCY MEDICINE
Payer: MEDICAID

## 2021-04-09 VITALS
WEIGHT: 220 LBS | TEMPERATURE: 97.7 F | DIASTOLIC BLOOD PRESSURE: 91 MMHG | OXYGEN SATURATION: 99 % | BODY MASS INDEX: 32.58 KG/M2 | HEIGHT: 69 IN | SYSTOLIC BLOOD PRESSURE: 147 MMHG | RESPIRATION RATE: 17 BRPM | HEART RATE: 106 BPM

## 2021-04-09 DIAGNOSIS — F41.1 ANXIETY STATE: Primary | ICD-10-CM

## 2021-04-09 PROCEDURE — 99284 EMERGENCY DEPT VISIT MOD MDM: CPT
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
## 2021-04-10 NOTE — ED NOTES
Patient states wants to leave. Discussed with Dr. Doc Espitia who states patient is capable of making decisions for herself. Patient states her plan is to call an [employer] and go to her parents house. Patient states she feels safe with that plan and that she does not want to stay because she has a counselor that she talks to. Patient alert and oriented. Ambulatory with a steady gait. Patient denies any thoughts of wanting to harm herself or anyone else. Patient states she called an ub[employer] and is leaving.

## 2021-04-10 NOTE — BSMART NOTE
BSMART Note Patient not seen. Chart accessed after consult called by ER, however patient requested to leave prior to being seen by this clinician. She had not reported suicidal or homicidal ideation so was allowed to leave by ER physician.  
 
Yulia Escoto MA

## 2024-06-28 ENCOUNTER — HOSPITAL ENCOUNTER (EMERGENCY)
Facility: HOSPITAL | Age: 34
Discharge: HOME | End: 2024-06-28
Attending: EMERGENCY MEDICINE
Payer: MEDICAID

## 2024-06-28 VITALS
SYSTOLIC BLOOD PRESSURE: 120 MMHG | HEIGHT: 69 IN | DIASTOLIC BLOOD PRESSURE: 83 MMHG | OXYGEN SATURATION: 99 % | HEART RATE: 91 BPM | TEMPERATURE: 97.4 F | BODY MASS INDEX: 24.39 KG/M2 | WEIGHT: 164.68 LBS | RESPIRATION RATE: 16 BRPM

## 2024-06-28 DIAGNOSIS — R11.2 NAUSEA AND VOMITING, UNSPECIFIED VOMITING TYPE: Primary | ICD-10-CM

## 2024-06-28 DIAGNOSIS — R42 LIGHTHEADEDNESS: ICD-10-CM

## 2024-06-28 LAB
ALBUMIN SERPL-MCNC: 3.7 G/DL (ref 3.5–5)
ALBUMIN/GLOB SERPL: 1.2 (ref 1.1–2.2)
ALP SERPL-CCNC: 48 U/L (ref 45–117)
ALT SERPL-CCNC: 30 U/L (ref 12–78)
ANION GAP SERPL CALC-SCNC: 10 MMOL/L (ref 5–15)
APPEARANCE UR: CLEAR
AST SERPL-CCNC: 34 U/L (ref 15–37)
BACTERIA URNS QL MICRO: NEGATIVE /HPF
BASOPHILS # BLD: 0 K/UL (ref 0–0.1)
BASOPHILS NFR BLD: 0 % (ref 0–1)
BILIRUB SERPL-MCNC: 0.5 MG/DL (ref 0.2–1)
BILIRUB UR QL: NEGATIVE
BUN SERPL-MCNC: 13 MG/DL (ref 6–20)
BUN/CREAT SERPL: 15 (ref 12–20)
CALCIUM SERPL-MCNC: 8.5 MG/DL (ref 8.5–10.1)
CHLORIDE SERPL-SCNC: 102 MMOL/L (ref 97–108)
CO2 SERPL-SCNC: 26 MMOL/L (ref 21–32)
COLOR UR: NORMAL
CREAT SERPL-MCNC: 0.87 MG/DL (ref 0.55–1.02)
DIFFERENTIAL METHOD BLD: NORMAL
EOSINOPHIL # BLD: 0.1 K/UL (ref 0–0.4)
EOSINOPHIL NFR BLD: 1 % (ref 0–7)
EPITH CASTS URNS QL MICRO: NORMAL /LPF
ERYTHROCYTE [DISTWIDTH] IN BLOOD BY AUTOMATED COUNT: 12.7 % (ref 11.5–14.5)
GLOBULIN SER CALC-MCNC: 3.1 G/DL (ref 2–4)
GLUCOSE SERPL-MCNC: 88 MG/DL (ref 65–100)
GLUCOSE UR STRIP.AUTO-MCNC: NEGATIVE MG/DL
HCG SERPL QL: NEGATIVE
HCG UR QL: NEGATIVE
HCT VFR BLD AUTO: 37.3 % (ref 35–47)
HGB BLD-MCNC: 12.7 G/DL (ref 11.5–16)
HGB UR QL STRIP: NEGATIVE
IMM GRANULOCYTES # BLD AUTO: 0 K/UL (ref 0–0.04)
IMM GRANULOCYTES NFR BLD AUTO: 0 % (ref 0–0.5)
KETONES UR QL STRIP.AUTO: NEGATIVE MG/DL
LEUKOCYTE ESTERASE UR QL STRIP.AUTO: NEGATIVE
LYMPHOCYTES # BLD: 1.7 K/UL (ref 0.8–3.5)
LYMPHOCYTES NFR BLD: 19 % (ref 12–49)
MCH RBC QN AUTO: 31.7 PG (ref 26–34)
MCHC RBC AUTO-ENTMCNC: 34 G/DL (ref 30–36.5)
MCV RBC AUTO: 93 FL (ref 80–99)
MONOCYTES # BLD: 0.5 K/UL (ref 0–1)
MONOCYTES NFR BLD: 5 % (ref 5–13)
NEUTS SEG # BLD: 6.6 K/UL (ref 1.8–8)
NEUTS SEG NFR BLD: 75 % (ref 32–75)
NITRITE UR QL STRIP.AUTO: NEGATIVE
NRBC # BLD: 0 K/UL (ref 0–0.01)
NRBC BLD-RTO: 0 PER 100 WBC
PH UR STRIP: 6.5 (ref 5–8)
PLATELET # BLD AUTO: 246 K/UL (ref 150–400)
PMV BLD AUTO: 10.2 FL (ref 8.9–12.9)
POTASSIUM SERPL-SCNC: 4 MMOL/L (ref 3.5–5.1)
PROT SERPL-MCNC: 6.8 G/DL (ref 6.4–8.2)
PROT UR STRIP-MCNC: NEGATIVE MG/DL
RBC # BLD AUTO: 4.01 M/UL (ref 3.8–5.2)
RBC #/AREA URNS HPF: NORMAL /HPF (ref 0–5)
SODIUM SERPL-SCNC: 138 MMOL/L (ref 136–145)
SP GR UR REFRACTOMETRY: 1.01 (ref 1–1.03)
UROBILINOGEN UR QL STRIP.AUTO: 0.2 EU/DL (ref 0.2–1)
WBC # BLD AUTO: 9 K/UL (ref 3.6–11)
WBC URNS QL MICRO: NORMAL /HPF (ref 0–4)

## 2024-06-28 PROCEDURE — 99284 EMERGENCY DEPT VISIT MOD MDM: CPT

## 2024-06-28 PROCEDURE — 81025 URINE PREGNANCY TEST: CPT

## 2024-06-28 PROCEDURE — 2580000003 HC RX 258: Performed by: EMERGENCY MEDICINE

## 2024-06-28 PROCEDURE — 81001 URINALYSIS AUTO W/SCOPE: CPT

## 2024-06-28 PROCEDURE — 36415 COLL VENOUS BLD VENIPUNCTURE: CPT

## 2024-06-28 PROCEDURE — 93005 ELECTROCARDIOGRAM TRACING: CPT | Performed by: EMERGENCY MEDICINE

## 2024-06-28 PROCEDURE — 84703 CHORIONIC GONADOTROPIN ASSAY: CPT

## 2024-06-28 PROCEDURE — 85025 COMPLETE CBC W/AUTO DIFF WBC: CPT

## 2024-06-28 PROCEDURE — 80053 COMPREHEN METABOLIC PANEL: CPT

## 2024-06-28 PROCEDURE — 6360000002 HC RX W HCPCS: Performed by: EMERGENCY MEDICINE

## 2024-06-28 PROCEDURE — 96374 THER/PROPH/DIAG INJ IV PUSH: CPT

## 2024-06-28 RX ORDER — GABAPENTIN 600 MG/1
600 TABLET ORAL DAILY
COMMUNITY
Start: 2024-06-17 | End: 2024-07-17

## 2024-06-28 RX ORDER — ONDANSETRON 4 MG/1
4 TABLET, ORALLY DISINTEGRATING ORAL 3 TIMES DAILY PRN
Qty: 21 TABLET | Refills: 0 | Status: SHIPPED | OUTPATIENT
Start: 2024-06-28

## 2024-06-28 RX ORDER — NORETHINDRONE ACETATE AND ETHINYL ESTRADIOL AND FERROUS FUMARATE 5-7-9-7
1 KIT ORAL DAILY
COMMUNITY

## 2024-06-28 RX ORDER — BACLOFEN 20 MG/1
20 TABLET ORAL 3 TIMES DAILY
COMMUNITY

## 2024-06-28 RX ORDER — LURASIDONE HYDROCHLORIDE 20 MG/1
20 TABLET, FILM COATED ORAL DAILY
COMMUNITY
Start: 2024-06-18

## 2024-06-28 RX ORDER — METOCLOPRAMIDE HYDROCHLORIDE 5 MG/ML
10 INJECTION INTRAMUSCULAR; INTRAVENOUS ONCE
Status: COMPLETED | OUTPATIENT
Start: 2024-06-28 | End: 2024-06-28

## 2024-06-28 RX ORDER — 0.9 % SODIUM CHLORIDE 0.9 %
1000 INTRAVENOUS SOLUTION INTRAVENOUS ONCE
Status: COMPLETED | OUTPATIENT
Start: 2024-06-28 | End: 2024-06-28

## 2024-06-28 RX ADMIN — SODIUM CHLORIDE 1000 ML: 9 INJECTION, SOLUTION INTRAVENOUS at 11:58

## 2024-06-28 RX ADMIN — METOCLOPRAMIDE 10 MG: 5 INJECTION, SOLUTION INTRAMUSCULAR; INTRAVENOUS at 11:58

## 2024-06-28 ASSESSMENT — LIFESTYLE VARIABLES
HOW OFTEN DO YOU HAVE A DRINK CONTAINING ALCOHOL: NEVER
HOW MANY STANDARD DRINKS CONTAINING ALCOHOL DO YOU HAVE ON A TYPICAL DAY: PATIENT DOES NOT DRINK

## 2024-06-28 ASSESSMENT — ENCOUNTER SYMPTOMS
NAUSEA: 1
ANAL BLEEDING: 0
ABDOMINAL DISTENTION: 0
COUGH: 0
SHORTNESS OF BREATH: 0
VOMITING: 1
ABDOMINAL PAIN: 1
DIARRHEA: 0
BLOOD IN STOOL: 0

## 2024-06-28 ASSESSMENT — PAIN SCALES - GENERAL
PAINLEVEL_OUTOF10: 1
PAINLEVEL_OUTOF10: 0

## 2024-06-28 ASSESSMENT — PAIN DESCRIPTION - ONSET: ONSET: ON-GOING

## 2024-06-28 ASSESSMENT — PAIN - FUNCTIONAL ASSESSMENT
PAIN_FUNCTIONAL_ASSESSMENT: PREVENTS OR INTERFERES SOME ACTIVE ACTIVITIES AND ADLS
PAIN_FUNCTIONAL_ASSESSMENT: 0-10

## 2024-06-28 ASSESSMENT — PAIN DESCRIPTION - FREQUENCY: FREQUENCY: CONTINUOUS

## 2024-06-28 ASSESSMENT — PAIN DESCRIPTION - LOCATION
LOCATION: ABDOMEN
LOCATION: ABDOMEN

## 2024-06-28 ASSESSMENT — PAIN DESCRIPTION - ORIENTATION: ORIENTATION: LOWER

## 2024-06-28 ASSESSMENT — PAIN DESCRIPTION - PAIN TYPE: TYPE: ACUTE PAIN

## 2024-06-28 ASSESSMENT — PAIN DESCRIPTION - DESCRIPTORS: DESCRIPTORS: CRAMPING

## 2024-06-28 NOTE — ED TRIAGE NOTES
Patient reports nausea with one episode of vomiting today. Lower abdominal cramping and low back pain. Flank non-tender. No dysuria. Normal BM yesterday.

## 2024-06-28 NOTE — ED PROVIDER NOTES
[facility] EMERGENCY CTR  EMERGENCY DEPARTMENT ENCOUNTER      Pt Name: Liya Villa  MRN: [id number]  Birthdate 1990  Date of evaluation: 6/28/2024  Provider: Jacques Ramires MD    CHIEF COMPLAINT       Chief Complaint   Patient presents with    Nausea         HISTORY OF PRESENT ILLNESS   (Location/Symptom, Timing/Onset, Context/Setting, Quality, Duration, Modifying Factors, Severity)  Note limiting factors.   33F w/ no pmhx p/w nausea and vomiting x1d. Pt reports nausea and vomiting earlier today w/ abd cramping. This occurred while at work. Also reports feeling lightheaded but no syncope/LOC or vertigo. Also generalized weakness/fatigue. No CP/SOB, diarrhea, rectal bleeding or urinary symptoms. No drugs/etoh or new/changes to meds. Abd cramping resolved.            Review of External Medical Records:     Nursing Notes were reviewed.    REVIEW OF SYSTEMS    (2-9 systems for level 4, 10 or more for level 5)     Review of Systems   Constitutional:  Negative for diaphoresis and fever.   HENT:  Negative for nosebleeds.    Eyes:  Negative for visual disturbance.   Respiratory:  Negative for cough and shortness of breath.    Cardiovascular:  Negative for chest pain, palpitations and leg swelling.   Gastrointestinal:  Positive for abdominal pain, nausea and vomiting. Negative for abdominal distention, anal bleeding, blood in stool and diarrhea.   Endocrine: Negative for polyuria.   Genitourinary:  Negative for difficulty urinating, dysuria, frequency and hematuria.   Musculoskeletal:  Negative for joint swelling.   Skin:  Negative for wound.   Allergic/Immunologic: Negative for immunocompromised state.   Neurological:  Positive for light-headedness. Negative for seizures and syncope.   Hematological:  Does not bruise/bleed easily.   Psychiatric/Behavioral:  Negative for confusion.        Except as noted above the remainder of the review of systems was reviewed and negative.       PAST MEDICAL HISTORY     Past Medical

## 2024-06-28 NOTE — ED NOTES
Reviewed discharge instructions with the patient, highlighting medication considerations, home care, the importance of medical follow-up and signs and symptoms requiring more immediate medical attention. Pt verbalizes understanding of the instructions given. VSS. Gait steady. No vomiting while in the ED.

## 2024-06-29 LAB
EKG ATRIAL RATE: 83 BPM
EKG DIAGNOSIS: NORMAL
EKG P AXIS: 27 DEGREES
EKG P-R INTERVAL: 138 MS
EKG Q-T INTERVAL: 388 MS
EKG QRS DURATION: 90 MS
EKG QTC CALCULATION (BAZETT): 455 MS
EKG R AXIS: 86 DEGREES
EKG T AXIS: 53 DEGREES
EKG VENTRICULAR RATE: 83 BPM

## 2024-06-29 PROCEDURE — 93010 ELECTROCARDIOGRAM REPORT: CPT | Performed by: SPECIALIST

## 2024-07-27 ENCOUNTER — HOSPITAL ENCOUNTER (EMERGENCY)
Facility: HOSPITAL | Age: 34
Discharge: HOME | End: 2024-07-27
Attending: EMERGENCY MEDICINE
Payer: MEDICAID

## 2024-07-27 VITALS
SYSTOLIC BLOOD PRESSURE: 123 MMHG | TEMPERATURE: 98.2 F | RESPIRATION RATE: 18 BRPM | OXYGEN SATURATION: 99 % | WEIGHT: 165.34 LBS | DIASTOLIC BLOOD PRESSURE: 70 MMHG | BODY MASS INDEX: 24.42 KG/M2 | HEART RATE: 84 BPM

## 2024-07-27 DIAGNOSIS — L73.2 HIDRADENITIS SUPPURATIVA: Primary | ICD-10-CM

## 2024-07-27 PROCEDURE — 99283 EMERGENCY DEPT VISIT LOW MDM: CPT

## 2024-07-27 RX ORDER — DOXYCYCLINE HYCLATE 100 MG
100 TABLET ORAL 2 TIMES DAILY
Qty: 20 TABLET | Refills: 0 | Status: SHIPPED | OUTPATIENT
Start: 2024-07-27 | End: 2024-08-06

## 2024-07-27 ASSESSMENT — PAIN DESCRIPTION - PAIN TYPE: TYPE: ACUTE PAIN

## 2024-07-27 ASSESSMENT — PAIN - FUNCTIONAL ASSESSMENT: PAIN_FUNCTIONAL_ASSESSMENT: 0-10

## 2024-07-27 ASSESSMENT — PAIN DESCRIPTION - ORIENTATION: ORIENTATION: LEFT

## 2024-07-27 ASSESSMENT — ENCOUNTER SYMPTOMS
RESPIRATORY NEGATIVE: 1
EYES NEGATIVE: 1
GASTROINTESTINAL NEGATIVE: 1

## 2024-07-27 ASSESSMENT — PAIN SCALES - GENERAL: PAINLEVEL_OUTOF10: 5

## 2024-07-27 ASSESSMENT — PAIN DESCRIPTION - LOCATION: LOCATION: GROIN

## 2024-07-27 NOTE — DISCHARGE INSTRUCTIONS
You were seen in the emergency department for a cyst which was consistent with a hidradenitis suppurativa flare. Please take any medications prescribed at this visit as instructed.  Please follow-up with your PCP and dermatologist or return to the emergency department if you experience a worsening of symptoms or any new symptoms that are concerning to you.
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
## 2025-03-02 ENCOUNTER — HOSPITAL ENCOUNTER (EMERGENCY)
Facility: HOSPITAL | Age: 35
Discharge: HOME | End: 2025-03-02
Attending: EMERGENCY MEDICINE
Payer: COMMERCIAL

## 2025-03-02 ENCOUNTER — APPOINTMENT (OUTPATIENT)
Facility: HOSPITAL | Age: 35
End: 2025-03-02
Payer: COMMERCIAL

## 2025-03-02 VITALS
TEMPERATURE: 97.6 F | WEIGHT: 157.63 LBS | SYSTOLIC BLOOD PRESSURE: 119 MMHG | HEART RATE: 70 BPM | BODY MASS INDEX: 23.35 KG/M2 | RESPIRATION RATE: 17 BRPM | DIASTOLIC BLOOD PRESSURE: 81 MMHG | OXYGEN SATURATION: 100 % | HEIGHT: 69 IN

## 2025-03-02 DIAGNOSIS — R51.9 NONINTRACTABLE HEADACHE, UNSPECIFIED CHRONICITY PATTERN, UNSPECIFIED HEADACHE TYPE: Primary | ICD-10-CM

## 2025-03-02 PROCEDURE — 96372 THER/PROPH/DIAG INJ SC/IM: CPT

## 2025-03-02 PROCEDURE — 6360000002 HC RX W HCPCS: Performed by: EMERGENCY MEDICINE

## 2025-03-02 PROCEDURE — 6370000000 HC RX 637 (ALT 250 FOR IP): Performed by: EMERGENCY MEDICINE

## 2025-03-02 PROCEDURE — 70450 CT HEAD/BRAIN W/O DYE: CPT

## 2025-03-02 PROCEDURE — 99284 EMERGENCY DEPT VISIT MOD MDM: CPT

## 2025-03-02 RX ORDER — BUTALBITAL, ACETAMINOPHEN AND CAFFEINE 50; 325; 40 MG/1; MG/1; MG/1
1 TABLET ORAL EVERY 4 HOURS PRN
Qty: 20 TABLET | Refills: 0 | Status: SHIPPED | OUTPATIENT
Start: 2025-03-02

## 2025-03-02 RX ORDER — KETOROLAC TROMETHAMINE 30 MG/ML
30 INJECTION, SOLUTION INTRAMUSCULAR; INTRAVENOUS
Status: COMPLETED | OUTPATIENT
Start: 2025-03-02 | End: 2025-03-02

## 2025-03-02 RX ORDER — KETOROLAC TROMETHAMINE 10 MG/1
10 TABLET, FILM COATED ORAL EVERY 6 HOURS PRN
Qty: 20 TABLET | Refills: 0 | Status: SHIPPED | OUTPATIENT
Start: 2025-03-02 | End: 2025-03-07

## 2025-03-02 RX ORDER — ONDANSETRON 4 MG/1
4 TABLET, ORALLY DISINTEGRATING ORAL EVERY 8 HOURS PRN
Status: DISCONTINUED | OUTPATIENT
Start: 2025-03-02 | End: 2025-03-02 | Stop reason: HOSPADM

## 2025-03-02 RX ADMIN — KETOROLAC TROMETHAMINE 30 MG: 30 INJECTION, SOLUTION INTRAMUSCULAR at 10:48

## 2025-03-02 RX ADMIN — ONDANSETRON 4 MG: 4 TABLET, ORALLY DISINTEGRATING ORAL at 10:48

## 2025-03-02 ASSESSMENT — PAIN DESCRIPTION - LOCATION: LOCATION: HEAD

## 2025-03-02 ASSESSMENT — PAIN SCALES - GENERAL: PAINLEVEL_OUTOF10: 6

## 2025-03-02 NOTE — ED NOTES
Discharge instructions reviewed with pt. Discharge instructions given to pt per MD. Pt able to return/verbalize discharge instructions. Copy of discharge instructions given.  RX sent to pharmacy. Pt condition stable, respiratory status within normal limits, neuro status intact. Pt ambulatory steady gait out of ER.

## 2025-03-02 NOTE — ED TRIAGE NOTES
Pt c/o migraine since Friday night. Pt states that she felt better this morning until she started driving. +nausea
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
## 2025-06-24 ENCOUNTER — OFFICE VISIT (OUTPATIENT)
Age: 35
End: 2025-06-24

## 2025-06-24 VITALS
SYSTOLIC BLOOD PRESSURE: 97 MMHG | DIASTOLIC BLOOD PRESSURE: 67 MMHG | WEIGHT: 161.8 LBS | RESPIRATION RATE: 18 BRPM | TEMPERATURE: 98.3 F | OXYGEN SATURATION: 98 % | HEIGHT: 69 IN | HEART RATE: 86 BPM | BODY MASS INDEX: 23.96 KG/M2

## 2025-06-24 DIAGNOSIS — E86.0 DEHYDRATION AFTER EXERTION: ICD-10-CM

## 2025-06-24 DIAGNOSIS — R11.0 NAUSEA: Primary | ICD-10-CM

## 2025-06-24 LAB
BILIRUBIN, URINE, POC: NEGATIVE
BLOOD URINE, POC: NORMAL
GLUCOSE URINE, POC: NEGATIVE
HCG, PREGNANCY, URINE, POC: NEGATIVE
KETONES, URINE, POC: NEGATIVE
LEUKOCYTE ESTERASE, URINE, POC: NEGATIVE
NITRITE, URINE, POC: NEGATIVE
PH, URINE, POC: 5.5 (ref 4.6–8)
PROTEIN,URINE, POC: NEGATIVE
SPECIFIC GRAVITY, URINE, POC: 1.02 (ref 1–1.03)
URINALYSIS CLARITY, POC: NORMAL
URINALYSIS COLOR, POC: YELLOW
UROBILINOGEN, POC: NORMAL
VALID INTERNAL CONTROL, POC: NORMAL

## 2025-06-24 RX ORDER — ONDANSETRON 4 MG/1
4 TABLET, ORALLY DISINTEGRATING ORAL 3 TIMES DAILY PRN
Qty: 15 TABLET | Refills: 0 | Status: SHIPPED | OUTPATIENT
Start: 2025-06-24 | End: 2025-06-29

## 2025-06-24 RX ORDER — PRAZOSIN HYDROCHLORIDE 2 MG/1
CAPSULE ORAL
COMMUNITY
Start: 2025-05-19

## 2025-06-24 NOTE — PATIENT INSTRUCTIONS
As discussed we are unable to do additional lab work or initiate fluids. If symptoms are not improving or becoming worse. I suggest seeking a higher level of care (Emergency room). No indication of infection in the urine, the urine is slightly cloudy and dark indicating dehydration.     Follow up with PCP in 3-5 days.

## 2025-06-24 NOTE — PROGRESS NOTES
Topics Concern    Not on file   Social History Narrative    Not on file     Social Drivers of Health     Financial Resource Strain: Not on file   Food Insecurity: No Food Insecurity (11/27/2023)    Received from [facility], [facility]    Hunger Vital Sign     Worried About Running Out of Food in the Last Year: Never true     Ran Out of Food in the Last Year: Never true   Transportation Needs: No Transportation Needs (11/27/2023)    Received from [facility], [employer] [facility]    PRAPARE - Transportation     In the past 12 months, has lack of transportation kept you from medical appointments or from getting medications?: No     In the past 12 months, has lack of transportation kept you from meetings, work, or from getting things needed for daily living?: No   Physical Activity: Not on file   Stress: Not on file   Social Connections: Not on file   Intimate Partner Violence: Not At Risk (11/27/2023)    Received from [employer] [facility], [facility] [employer] [facility]    Humiliation, Afraid, Rape, and Kick questionnaire     Fear of Current or Ex-Partner: No     Emotionally Abused: No     Physically Abused: No     Sexually Abused: No   Housing Stability: Unknown (11/27/2023)    Received from [employer] [facility], [facility]    Housing Stability Vital Sign     Unable to Pay for Housing in the Last Year: No     Number of Places Lived in the Last Year: Not on file     Unstable Housing in the Last Year: No       Allergies: Patient  Allergies   Allergen Reactions    Amoxicillin Rash    Sulfa Antibiotics Rash         OBJECTIVE:   Blood pressure 97/67, pulse 86, temperature 98.3 °F (36.8 °C), temperature source Oral, resp. rate 18, height 1.753 m (5' 9\"), weight 73.4 kg (161 lb 12.8 oz), SpO2 98%.    All Vitals reviewed by myself    Physical Exam  Vitals

## 2025-08-07 ENCOUNTER — OFFICE VISIT (OUTPATIENT)
Age: 35
End: 2025-08-07

## 2025-08-07 VITALS
RESPIRATION RATE: 18 BRPM | SYSTOLIC BLOOD PRESSURE: 143 MMHG | OXYGEN SATURATION: 98 % | HEART RATE: 78 BPM | TEMPERATURE: 98.6 F | HEIGHT: 69 IN | BODY MASS INDEX: 24.47 KG/M2 | WEIGHT: 165.2 LBS | DIASTOLIC BLOOD PRESSURE: 90 MMHG

## 2025-08-07 DIAGNOSIS — L08.9 INFECTED ABRASION OF RIGHT KNEE, INITIAL ENCOUNTER: Primary | ICD-10-CM

## 2025-08-07 DIAGNOSIS — S80.211A INFECTED ABRASION OF RIGHT KNEE, INITIAL ENCOUNTER: Primary | ICD-10-CM

## 2025-08-07 PROBLEM — R07.89 ATYPICAL CHEST PAIN: Status: ACTIVE | Noted: 2019-04-03

## 2025-08-07 PROBLEM — L02.214 ABSCESS OF GROIN: Status: ACTIVE | Noted: 2018-02-14

## 2025-08-07 PROBLEM — L55.9 SUNBURN: Status: ACTIVE | Noted: 2017-09-23

## 2025-08-07 PROBLEM — L02.91 ABSCESS: Status: ACTIVE | Noted: 2017-08-28

## 2025-08-07 PROBLEM — H66.90 OTITIS MEDIA: Status: ACTIVE | Noted: 2020-04-09

## 2025-08-07 PROBLEM — F41.0 PANIC ATTACK: Status: ACTIVE | Noted: 2017-09-18

## 2025-08-07 PROBLEM — F10.20 ALCOHOLISM (HCC): Status: ACTIVE | Noted: 2025-08-07

## 2025-08-07 PROBLEM — L25.9 CONTACT DERMATITIS: Status: ACTIVE | Noted: 2021-05-16

## 2025-08-07 PROBLEM — T43.91XA: Status: ACTIVE | Noted: 2017-02-07

## 2025-08-07 PROBLEM — L23.7 CONTACT DERMATITIS DUE TO POISON IVY: Status: ACTIVE | Noted: 2021-05-16

## 2025-08-07 PROBLEM — F10.929 ALCOHOL INTOXICATION: Status: ACTIVE | Noted: 2021-10-05

## 2025-08-07 PROBLEM — R00.2 PALPITATIONS: Status: ACTIVE | Noted: 2023-02-04

## 2025-08-07 PROBLEM — L03.90 CELLULITIS: Status: ACTIVE | Noted: 2023-05-29

## 2025-08-07 PROBLEM — G89.18 POSTOPERATIVE PAIN: Status: ACTIVE | Noted: 2017-12-19

## 2025-08-07 PROBLEM — T14.91XA SUICIDE ATTEMPT (HCC): Status: ACTIVE | Noted: 2017-02-07

## 2025-08-07 PROBLEM — Z48.02 ENCOUNTER FOR REMOVAL OF SUTURES: Status: ACTIVE | Noted: 2025-08-07

## 2025-08-07 PROBLEM — Z76.0 ENCOUNTER FOR MEDICATION REFILL: Status: ACTIVE | Noted: 2017-09-18

## 2025-08-07 PROBLEM — Z71.9 ENCOUNTER FOR HEALTH EDUCATION: Status: ACTIVE | Noted: 2025-08-07

## 2025-08-07 PROBLEM — L73.2 HIDRADENITIS SUPPURATIVA: Status: ACTIVE | Noted: 2019-08-11

## 2025-08-07 PROBLEM — N89.8 CYST OF VAGINA: Status: ACTIVE | Noted: 2018-11-23

## 2025-08-07 PROBLEM — F43.0 ACUTE STRESS DISORDER: Status: ACTIVE | Noted: 2021-07-09

## 2025-08-07 RX ORDER — DOXYCYCLINE HYCLATE 100 MG
100 TABLET ORAL 2 TIMES DAILY
Qty: 14 TABLET | Refills: 0 | Status: SHIPPED | OUTPATIENT
Start: 2025-08-07 | End: 2025-08-14